# Patient Record
Sex: FEMALE | Employment: OTHER | URBAN - METROPOLITAN AREA
[De-identification: names, ages, dates, MRNs, and addresses within clinical notes are randomized per-mention and may not be internally consistent; named-entity substitution may affect disease eponyms.]

---

## 2018-02-26 ENCOUNTER — HOSPITAL ENCOUNTER (OUTPATIENT)
Dept: CT IMAGING | Age: 73
Discharge: HOME | End: 2018-02-26
Attending: INTERNAL MEDICINE
Payer: MEDICARE

## 2018-02-26 DIAGNOSIS — I26.99 PULMONARY EMBOLISM (HCC): ICD-10-CM

## 2018-02-26 LAB — CREAT UR-MCNC: 1 MG/DL (ref 0.6–1.3)

## 2018-02-26 PROCEDURE — 71275 CT ANGIOGRAPHY CHEST: CPT

## 2018-02-26 PROCEDURE — 74011636320 HC RX REV CODE- 636/320: Performed by: INTERNAL MEDICINE

## 2018-02-26 PROCEDURE — 82565 ASSAY OF CREATININE: CPT

## 2018-02-26 RX ADMIN — IOPAMIDOL 100 ML: 755 INJECTION, SOLUTION INTRAVENOUS at 13:35

## 2018-03-02 RX ORDER — ISOSORBIDE DINITRATE 40 MG/1
30 TABLET ORAL DAILY
Status: ON HOLD | COMMUNITY
End: 2018-04-23

## 2018-03-02 RX ORDER — CHOLECALCIFEROL (VITAMIN D3) 125 MCG
CAPSULE ORAL
Status: ON HOLD | COMMUNITY
End: 2018-04-23

## 2018-03-02 RX ORDER — LANOLIN ALCOHOL/MO/W.PET/CERES
400 CREAM (GRAM) TOPICAL DAILY
COMMUNITY

## 2018-03-02 NOTE — ROUTINE PROCESS
Cardiac Cath Lab:  Pre Procedure Chart Check     Patients chart was accessed and reviewed for possible and/or scheduled procedure. Creatinine Clearance:  Creatinine clearance cannot be calculated (Unknown ideal weight.)    Total Contrast  Load:  3 x estimated clearance amount=  ml    75% of Contrast Load:  0.75 x Total Contrast Load=    ml    No results for input(s): WBC, RBC, HCT, HGB, PLT, INR, APTT, PTP, NA, K, BUN, CREA, GFRAA, GFRNA, CA, MAG, CPK, CKMB, CKNDX, CKND1, TROPT, TROIQ, BNPP, BNP, HCTEXT, HGBEXT, PLTEXT in the last 72 hours. No lab exists for component: DDIMER, GLUCOSE, INREXT    BMI: There is no height or weight on file to calculate BMI. ALLERGIES:   Allergies   Allergen Reactions    Contrast Dye [Iodine] Anaphylaxis    Compazine [Prochlorperazine Edisylate] Other (comments)     Tries to swallow tongue?          Lines:                History:    Past Medical History:   Diagnosis Date    CAD (coronary artery disease)     Clostridium difficile diarrhea      Past Surgical History:   Procedure Laterality Date    HX CHOLECYSTECTOMY      HX COLONOSCOPY      HX CORONARY ARTERY BYPASS GRAFT      HX HYSTERECTOMY      HX ORTHOPAEDIC       Patient Active Problem List   Diagnosis Code    CHF (congestive heart failure) ([facility] [address] I50.9

## 2018-03-05 ENCOUNTER — HOSPITAL ENCOUNTER (OUTPATIENT)
Dept: CARDIAC CATH/INVASIVE PROCEDURES | Age: 73
Discharge: HOME | End: 2018-03-05
Attending: INTERNAL MEDICINE | Admitting: INTERNAL MEDICINE
Payer: MEDICARE

## 2018-03-05 VITALS
BODY MASS INDEX: 25.44 KG/M2 | DIASTOLIC BLOOD PRESSURE: 69 MMHG | TEMPERATURE: 98.6 F | SYSTOLIC BLOOD PRESSURE: 143 MMHG | RESPIRATION RATE: 18 BRPM | OXYGEN SATURATION: 97 % | HEART RATE: 78 BPM | HEIGHT: 64 IN | WEIGHT: 149 LBS

## 2018-03-05 LAB
ANION GAP SERPL CALC-SCNC: 6 MMOL/L (ref 3–18)
BUN SERPL-MCNC: 15 MG/DL (ref 7–18)
BUN/CREAT SERPL: 15 (ref 12–20)
CALCIUM SERPL-MCNC: 9.5 MG/DL (ref 8.5–10.1)
CHLORIDE SERPL-SCNC: 105 MMOL/L (ref 100–108)
CHOLEST SERPL-MCNC: 151 MG/DL
CO2 SERPL-SCNC: 26 MMOL/L (ref 21–32)
CREAT SERPL-MCNC: 1.03 MG/DL (ref 0.6–1.3)
ERYTHROCYTE [DISTWIDTH] IN BLOOD BY AUTOMATED COUNT: 12.8 % (ref 11.6–14.5)
GLUCOSE SERPL-MCNC: 136 MG/DL (ref 74–99)
HCT VFR BLD AUTO: 37.2 % (ref 35–45)
HDLC SERPL-MCNC: 39 MG/DL (ref 40–60)
HDLC SERPL: 3.9 {RATIO} (ref 0–5)
HGB BLD-MCNC: 12.5 G/DL (ref 12–16)
INR PPP: 0.9 (ref 0.8–1.2)
LDLC SERPL CALC-MCNC: 72 MG/DL (ref 0–100)
LIPID PROFILE,FLP: ABNORMAL
MAGNESIUM SERPL-MCNC: 2.3 MG/DL (ref 1.6–2.6)
MCH RBC QN AUTO: 30.8 PG (ref 24–34)
MCHC RBC AUTO-ENTMCNC: 33.6 G/DL (ref 31–37)
MCV RBC AUTO: 91.6 FL (ref 74–97)
PLATELET # BLD AUTO: 228 K/UL (ref 135–420)
PMV BLD AUTO: 10.2 FL (ref 9.2–11.8)
POTASSIUM SERPL-SCNC: 3.9 MMOL/L (ref 3.5–5.5)
PROTHROMBIN TIME: 12 SEC (ref 11.5–15.2)
RBC # BLD AUTO: 4.06 M/UL (ref 4.2–5.3)
SODIUM SERPL-SCNC: 137 MMOL/L (ref 136–145)
TRIGL SERPL-MCNC: 200 MG/DL (ref ?–150)
VLDLC SERPL CALC-MCNC: 40 MG/DL
WBC # BLD AUTO: 8.5 K/UL (ref 4.6–13.2)

## 2018-03-05 PROCEDURE — 74011250636 HC RX REV CODE- 250/636: Performed by: INTERNAL MEDICINE

## 2018-03-05 PROCEDURE — 74011250637 HC RX REV CODE- 250/637: Performed by: INTERNAL MEDICINE

## 2018-03-05 PROCEDURE — 74011250636 HC RX REV CODE- 250/636

## 2018-03-05 PROCEDURE — 74011000250 HC RX REV CODE- 250: Performed by: INTERNAL MEDICINE

## 2018-03-05 PROCEDURE — 80061 LIPID PANEL: CPT | Performed by: INTERNAL MEDICINE

## 2018-03-05 PROCEDURE — 85027 COMPLETE CBC AUTOMATED: CPT | Performed by: INTERNAL MEDICINE

## 2018-03-05 PROCEDURE — 80048 BASIC METABOLIC PNL TOTAL CA: CPT | Performed by: INTERNAL MEDICINE

## 2018-03-05 PROCEDURE — 83735 ASSAY OF MAGNESIUM: CPT | Performed by: INTERNAL MEDICINE

## 2018-03-05 PROCEDURE — 74011636320 HC RX REV CODE- 636/320: Performed by: INTERNAL MEDICINE

## 2018-03-05 PROCEDURE — 99153 MOD SED SAME PHYS/QHP EA: CPT

## 2018-03-05 PROCEDURE — 74011000250 HC RX REV CODE- 250

## 2018-03-05 PROCEDURE — 85610 PROTHROMBIN TIME: CPT | Performed by: INTERNAL MEDICINE

## 2018-03-05 RX ORDER — ALPRAZOLAM 0.25 MG/1
0.25 TABLET ORAL
Status: COMPLETED | OUTPATIENT
Start: 2018-03-05 | End: 2018-03-05

## 2018-03-05 RX ORDER — FAMOTIDINE 10 MG/ML
20 INJECTION INTRAVENOUS EVERY 12 HOURS
Status: COMPLETED | OUTPATIENT
Start: 2018-03-05 | End: 2018-03-05

## 2018-03-05 RX ORDER — SODIUM CHLORIDE 9 MG/ML
50 INJECTION, SOLUTION INTRAVENOUS CONTINUOUS
Status: DISCONTINUED | OUTPATIENT
Start: 2018-03-05 | End: 2018-03-05 | Stop reason: HOSPADM

## 2018-03-05 RX ORDER — ALPRAZOLAM 0.5 MG/1
0.5 TABLET ORAL
Status: DISCONTINUED | OUTPATIENT
Start: 2018-03-05 | End: 2018-03-05

## 2018-03-05 RX ORDER — MIDAZOLAM HYDROCHLORIDE 1 MG/ML
.5-2 INJECTION, SOLUTION INTRAMUSCULAR; INTRAVENOUS
Status: DISCONTINUED | OUTPATIENT
Start: 2018-03-05 | End: 2018-03-05 | Stop reason: HOSPADM

## 2018-03-05 RX ORDER — HYDRALAZINE HYDROCHLORIDE 20 MG/ML
10 INJECTION INTRAMUSCULAR; INTRAVENOUS
Status: DISCONTINUED | OUTPATIENT
Start: 2018-03-05 | End: 2018-03-05 | Stop reason: HOSPADM

## 2018-03-05 RX ORDER — NITROGLYCERIN 5 MG/ML
INJECTION, SOLUTION INTRAVENOUS
Status: DISCONTINUED
Start: 2018-03-05 | End: 2018-03-05 | Stop reason: HOSPADM

## 2018-03-05 RX ORDER — SODIUM CHLORIDE 0.9 % (FLUSH) 0.9 %
5-10 SYRINGE (ML) INJECTION EVERY 8 HOURS
Status: DISCONTINUED | OUTPATIENT
Start: 2018-03-05 | End: 2018-03-05 | Stop reason: HOSPADM

## 2018-03-05 RX ORDER — LIDOCAINE HYDROCHLORIDE 10 MG/ML
10-30 INJECTION INFILTRATION; PERINEURAL
Status: DISCONTINUED | OUTPATIENT
Start: 2018-03-05 | End: 2018-03-05 | Stop reason: HOSPADM

## 2018-03-05 RX ORDER — LIDOCAINE HYDROCHLORIDE 10 MG/ML
INJECTION INFILTRATION; PERINEURAL
Status: COMPLETED
Start: 2018-03-05 | End: 2018-03-05

## 2018-03-05 RX ORDER — MIDAZOLAM HYDROCHLORIDE 1 MG/ML
INJECTION, SOLUTION INTRAMUSCULAR; INTRAVENOUS
Status: COMPLETED
Start: 2018-03-05 | End: 2018-03-05

## 2018-03-05 RX ORDER — FENTANYL CITRATE 50 UG/ML
INJECTION, SOLUTION INTRAMUSCULAR; INTRAVENOUS
Status: COMPLETED
Start: 2018-03-05 | End: 2018-03-05

## 2018-03-05 RX ORDER — HEPARIN SODIUM 200 [USP'U]/100ML
500 INJECTION, SOLUTION INTRAVENOUS
Status: DISCONTINUED | OUTPATIENT
Start: 2018-03-05 | End: 2018-03-05 | Stop reason: HOSPADM

## 2018-03-05 RX ORDER — GUAIFENESIN 100 MG/5ML
81 LIQUID (ML) ORAL DAILY
Status: COMPLETED | OUTPATIENT
Start: 2018-03-05 | End: 2018-03-05

## 2018-03-05 RX ORDER — HYDRALAZINE HYDROCHLORIDE 20 MG/ML
INJECTION INTRAMUSCULAR; INTRAVENOUS
Status: COMPLETED
Start: 2018-03-05 | End: 2018-03-05

## 2018-03-05 RX ORDER — SODIUM CHLORIDE 0.9 % (FLUSH) 0.9 %
5-10 SYRINGE (ML) INJECTION AS NEEDED
Status: DISCONTINUED | OUTPATIENT
Start: 2018-03-05 | End: 2018-03-05 | Stop reason: HOSPADM

## 2018-03-05 RX ORDER — DEXTROMETHORPHAN HYDROBROMIDE, GUAIFENESIN 5; 100 MG/5ML; MG/5ML
650 LIQUID ORAL EVERY 8 HOURS
COMMUNITY

## 2018-03-05 RX ORDER — ADENOSINE 3 MG/ML
140 INJECTION, SOLUTION INTRAVENOUS
Status: DISCONTINUED | OUTPATIENT
Start: 2018-03-05 | End: 2018-03-05 | Stop reason: HOSPADM

## 2018-03-05 RX ORDER — DIPHENHYDRAMINE HYDROCHLORIDE 50 MG/ML
25 INJECTION, SOLUTION INTRAMUSCULAR; INTRAVENOUS ONCE
Status: COMPLETED | OUTPATIENT
Start: 2018-03-05 | End: 2018-03-05

## 2018-03-05 RX ORDER — VERAPAMIL HYDROCHLORIDE 2.5 MG/ML
INJECTION, SOLUTION INTRAVENOUS
Status: DISCONTINUED
Start: 2018-03-05 | End: 2018-03-05 | Stop reason: HOSPADM

## 2018-03-05 RX ORDER — HEPARIN SODIUM 200 [USP'U]/100ML
INJECTION, SOLUTION INTRAVENOUS
Status: COMPLETED
Start: 2018-03-05 | End: 2018-03-05

## 2018-03-05 RX ORDER — HEPARIN SODIUM 1000 [USP'U]/ML
INJECTION, SOLUTION INTRAVENOUS; SUBCUTANEOUS
Status: DISCONTINUED
Start: 2018-03-05 | End: 2018-03-05 | Stop reason: HOSPADM

## 2018-03-05 RX ORDER — LORAZEPAM 1 MG/1
.5-1 TABLET ORAL ONCE
Status: COMPLETED | OUTPATIENT
Start: 2018-03-05 | End: 2018-03-05

## 2018-03-05 RX ORDER — FENTANYL CITRATE 50 UG/ML
25-100 INJECTION, SOLUTION INTRAMUSCULAR; INTRAVENOUS
Status: DISCONTINUED | OUTPATIENT
Start: 2018-03-05 | End: 2018-03-05 | Stop reason: HOSPADM

## 2018-03-05 RX ORDER — HEPARIN SODIUM 1000 [USP'U]/ML
4000 INJECTION, SOLUTION INTRAVENOUS; SUBCUTANEOUS ONCE
Status: COMPLETED | OUTPATIENT
Start: 2018-03-05 | End: 2018-03-05

## 2018-03-05 RX ADMIN — FENTANYL CITRATE 50 MCG: 50 INJECTION, SOLUTION INTRAMUSCULAR; INTRAVENOUS at 09:43

## 2018-03-05 RX ADMIN — MIDAZOLAM HYDROCHLORIDE 1 MG: 1 INJECTION, SOLUTION INTRAMUSCULAR; INTRAVENOUS at 09:43

## 2018-03-05 RX ADMIN — FAMOTIDINE 20 MG: 10 INJECTION, SOLUTION INTRAVENOUS at 08:34

## 2018-03-05 RX ADMIN — MIDAZOLAM HYDROCHLORIDE 1 MG: 1 INJECTION, SOLUTION INTRAMUSCULAR; INTRAVENOUS at 10:27

## 2018-03-05 RX ADMIN — FENTANYL CITRATE 50 MCG: 50 INJECTION, SOLUTION INTRAMUSCULAR; INTRAVENOUS at 10:28

## 2018-03-05 RX ADMIN — HEPARIN SODIUM 1000 UNITS: 200 INJECTION, SOLUTION INTRAVENOUS at 09:09

## 2018-03-05 RX ADMIN — IOPAMIDOL 150 ML: 510 INJECTION, SOLUTION INTRAVASCULAR at 10:57

## 2018-03-05 RX ADMIN — LIDOCAINE HYDROCHLORIDE 5 ML: 10 INJECTION INFILTRATION; PERINEURAL at 09:46

## 2018-03-05 RX ADMIN — LIDOCAINE HYDROCHLORIDE 10 ML: 10 INJECTION INFILTRATION; PERINEURAL at 09:53

## 2018-03-05 RX ADMIN — LIDOCAINE HYDROCHLORIDE 5 ML: 10 INJECTION INFILTRATION; PERINEURAL at 09:42

## 2018-03-05 RX ADMIN — HEPARIN SODIUM 1000 UNITS: 200 INJECTION, SOLUTION INTRAVENOUS at 10:53

## 2018-03-05 RX ADMIN — FENTANYL CITRATE 50 MCG: 50 INJECTION, SOLUTION INTRAMUSCULAR; INTRAVENOUS at 09:49

## 2018-03-05 RX ADMIN — LORAZEPAM 1 MG: 1 TABLET ORAL at 08:31

## 2018-03-05 RX ADMIN — HYDRALAZINE HYDROCHLORIDE 10 MG: 20 INJECTION INTRAMUSCULAR; INTRAVENOUS at 10:18

## 2018-03-05 RX ADMIN — FENTANYL CITRATE 25 MCG: 50 INJECTION, SOLUTION INTRAMUSCULAR; INTRAVENOUS at 11:27

## 2018-03-05 RX ADMIN — FENTANYL CITRATE 25 MCG: 50 INJECTION, SOLUTION INTRAMUSCULAR; INTRAVENOUS at 11:23

## 2018-03-05 RX ADMIN — DIPHENHYDRAMINE HYDROCHLORIDE 25 MG: 50 INJECTION, SOLUTION INTRAMUSCULAR; INTRAVENOUS at 08:33

## 2018-03-05 RX ADMIN — MIDAZOLAM HYDROCHLORIDE 0.5 MG: 1 INJECTION, SOLUTION INTRAMUSCULAR; INTRAVENOUS at 09:54

## 2018-03-05 RX ADMIN — HYDRALAZINE HYDROCHLORIDE 10 MG: 20 INJECTION INTRAMUSCULAR; INTRAVENOUS at 10:28

## 2018-03-05 RX ADMIN — ASPIRIN 81 MG 81 MG: 81 TABLET ORAL at 08:31

## 2018-03-05 RX ADMIN — HEPARIN SODIUM 1000 UNITS: 200 INJECTION, SOLUTION INTRAVENOUS at 09:00

## 2018-03-05 RX ADMIN — HEPARIN SODIUM 3000 UNITS: 1000 INJECTION, SOLUTION INTRAVENOUS; SUBCUTANEOUS at 10:24

## 2018-03-05 RX ADMIN — MIDAZOLAM HYDROCHLORIDE 0.5 MG: 1 INJECTION, SOLUTION INTRAMUSCULAR; INTRAVENOUS at 11:23

## 2018-03-05 RX ADMIN — IOPAMIDOL 155 ML: 510 INJECTION, SOLUTION INTRAVASCULAR at 11:39

## 2018-03-05 RX ADMIN — METHYLPREDNISOLONE SODIUM SUCCINATE 125 MG: 125 INJECTION, POWDER, FOR SOLUTION INTRAMUSCULAR; INTRAVENOUS at 08:36

## 2018-03-05 RX ADMIN — HEPARIN SODIUM 4000 UNITS: 1000 INJECTION, SOLUTION INTRAVENOUS; SUBCUTANEOUS at 09:49

## 2018-03-05 RX ADMIN — MIDAZOLAM HYDROCHLORIDE 1 MG: 1 INJECTION, SOLUTION INTRAMUSCULAR; INTRAVENOUS at 11:27

## 2018-03-05 RX ADMIN — SODIUM CHLORIDE 50 ML/HR: 900 INJECTION, SOLUTION INTRAVENOUS at 11:24

## 2018-03-05 RX ADMIN — LIDOCAINE HYDROCHLORIDE 5 ML: 10 INJECTION, SOLUTION INFILTRATION; PERINEURAL at 09:42

## 2018-03-05 RX ADMIN — ALPRAZOLAM 0.25 MG: 0.25 TABLET ORAL at 15:47

## 2018-03-05 RX ADMIN — SODIUM CHLORIDE 50 ML/HR: 900 INJECTION, SOLUTION INTRAVENOUS at 08:31

## 2018-03-05 NOTE — PROGRESS NOTES
1200  Pt returned from cath lab alert, states abd hurting, unable to urinate,  Pat RCIS states bladder is distended under mahamed scope, #18fr najera cath placed under sterile tech, 700 clear pale yellow urine obtained, pt tolerated procedure well  1220 pt sleeping, no distress noted  1240 venous sheath in right groin area removed.  Gwyn pressure applied, area covered with 4x4 and Tegaderm, neuro/vasc assessment of right lower extremity WNL

## 2018-03-05 NOTE — PROGRESS NOTES
Pt discharged via wheelchair to car in care of ,  Pt states headache 8/10 but does not want to go to er.   Pt advised to go to ER if headache does not get better after eating and resting,  Arm bands removed and shredded

## 2018-03-05 NOTE — IP AVS SNAPSHOT
[address] [address] 
[phone] Patient: Jose Guallpa MRN: [id number] :1945 About your hospitalization You were admitted on:  2018 You last received care in the:  [address] You were discharged on:  2018 Why you were hospitalized Your primary diagnosis was:  Not on File Follow-up Information Follow up With Details Comments Contact Info Medina Kelsey. Ishmael Bui, [address] [facility] [address] [address] 
[phone] Charlotte Rodriguez MD In 2 weeks Follow up as instructed [address] [facility] [address] [facility] 
[phone] Discharge Orders None A check nettie indicates which time of day the medication should be taken. My Medications CONTINUE taking these medications Instructions Each Dose to Equal  
 Morning Noon Evening Bedtime  
 acetaminophen 650 mg Tber Commonly known as:  TYLENOL Your last dose was: Your next dose is: Take 650 mg by mouth every eight (8) hours. 650 mg  
    
   
   
   
  
 aspirin delayed-release 325 mg tablet Your last dose was: Your next dose is: Take  by mouth every six (6) hours as needed for Pain. COQ10  100-100 mg-unit Cap Generic drug:  coenzyme q10-vitamin e Your last dose was: Your next dose is: Take  by mouth. ISORDIL 40 mg tablet Generic drug:  isosorbide dinitrate Your last dose was: Your next dose is: Take 30 mg by mouth daily. 30 mg  
    
   
   
   
  
 L-METHYLFOLATE 7.5 mg Tab Generic drug:  l-methylfolate Your last dose was: Your next dose is: Take  by mouth. LIPITOR 80 mg tablet Generic drug:  atorvastatin Your last dose was: Your next dose is: Take 80 mg by mouth daily. 80 mg  
    
   
   
   
  
 magnesium oxide 400 mg tablet Commonly known as:  MAG-OX Your last dose was: Your next dose is: Take 400 mg by mouth daily. 400 mg  
    
   
   
   
  
 metoprolol tartrate 25 mg tablet Commonly known as:  LOPRESSOR Your last dose was: Your next dose is: Take 1 Tab by mouth every twelve (12) hours. 25 mg  
    
   
   
   
  
 nitroglycerin 0.4 mg SL tablet Commonly known as:  NITROSTAT Your last dose was: Your next dose is: 0.4 mg by SubLINGual route every five (5) minutes as needed for Chest Pain. 0.4 mg  
    
   
   
   
  
 potassium chloride 20 mEq packet Commonly known as:  KLOR-CON Your last dose was: Your next dose is: Take 20 mEq by mouth two (2) times a day. 20 mEq PROzac 20 mg capsule Generic drug:  FLUoxetine Your last dose was: Your next dose is: Take 20 mg by mouth daily. 20 mg  
    
   
   
   
  
 torsemide 5 mg tablet Commonly known as:  DEMADEX Your last dose was: Your next dose is: Take 5 mg by mouth daily. As needed  Indications: Edema  
 5 mg VIT B COMPLEX 100 COMBO NO.2 PO Your last dose was: Your next dose is: Take  by mouth. VITAMIN D3 2,000 unit Tab Generic drug:  cholecalciferol (vitamin D3) Your last dose was: Your next dose is: Take  by mouth. Discharge Instructions Cardiac Catheterization/Angiography Discharge Instructions *Check the puncture site frequently for swelling or bleeding. If you see any bleeding, lie down and apply pressure over the area with a clean town or washcloth.  Notify your doctor for any redness, swelling, drainage or oozing from the puncture site. Notify your doctor for any fever or chills. *If the leg or arm with the puncture becomes cold, numb or painful, call Dr Jessica Garcia *Activity should be limited for the next 48 hours. Climb stairs as little as possible and avoid any stooping, bending or strenuous activity for 48 hours. No heavy lifting (anything over 10 pounds) for three days. *Do not drive for 48 hours. *You may resume your usual diet. Drink more fluids than usual. 
 
*Have a responsible person drive you home and stay with you for at least 24 hours after your heart catheterization/angiography. *You may remove the bandage from your Right and Arm in 24 hours. You may shower in 24 hours. No tub baths, hot tubs or swimming for one week. Do not place any lotions, creams, powders, ointments over the puncture site for one week. You may place a clean band-aid over the puncture site each day for 5 days. Change this daily. DISCHARGE SUMMARY from Nurse PATIENT INSTRUCTIONS: 
 
 
F-face looks uneven A-arms unable to move or move unevenly S-speech slurred or non-existent T-time-call 911 as soon as signs and symptoms begin-DO NOT go Back to bed or wait to see if you get better-TIME IS BRAIN. Warning Signs of HEART ATTACK Call 911 if you have these symptoms: 
? Chest discomfort. Most heart attacks involve discomfort in the center of the chest that lasts more than a few minutes, or that goes away and comes back. It can feel like uncomfortable pressure, squeezing, fullness, or pain. ? Discomfort in other areas of the upper body. Symptoms can include pain or discomfort in one or both arms, the back, neck, jaw, or stomach. ? Shortness of breath with or without chest discomfort. ? Other signs may include breaking out in a cold sweat, nausea, or lightheadedness. Don't wait more than five minutes to call [address]! Fast action can save your life. Calling 911 is almost always the fastest way to get lifesaving treatment. Emergency Medical Services staff can begin treatment when they arrive  up to an hour sooner than if someone gets to the hospital by car. The discharge information has been reviewed with the patient and spouse. The patient and spouse verbalized understanding. Discharge medications reviewed with the patient and spouse and appropriate educational materials and side effects teaching were provided. Patient armband removed and shredded 
___________________________________________________________________________________________________________________________________ Introducing [facility]! Satinder Davis introduces [employer] patient portal. Now you can access parts of your medical record, email your doctor's office, and request medication refills online. 1. In your internet browser, go to [email or web address] 2. Click on the First Time User? Click Here link in the Sign In box. You will see the New Member Sign Up page. 3. Enter your [employer] Access Code exactly as it appears below. You will not need to use this code after youve completed the sign-up process. If you do not sign up before the expiration date, you must request a new code. · [employer] Access Code: [email or web address] Expires: 5/20/2018  2:26 PM 
 
4. Enter the last four digits of your Social Security Number (xxxx) and Date of Birth (mm/dd/yyyy) as indicated and click Submit. You will be taken to the next sign-up page. 5. Create a [employer] ID. This will be your [employer] login ID and cannot be changed, so think of one that is secure and easy to remember. 6. Create a [employer] password. You can change your password at any time. 7. Enter your Password Reset Question and Answer. This can be used at a later time if you forget your password. 8. Enter your e-mail address. You will receive e-mail notification when new information is available in [address] 9. Click Sign Up. You can now view and download portions of your medical record. 10. Click the Download Summary menu link to download a portable copy of your medical information. If you have questions, please visit the Frequently Asked Questions section of the [email or web address] website. Remember, [email or web address] is NOT to be used for urgent needs. For medical emergencies, dial 911. Now available from your iPhone and Android! Providers Seen During Your Hospitalization Provider Specialty Primary office phone Charlotte Rodriguez MD Cardiology [phone] Your Primary Care Physician (PCP) Primary Care Physician Office Phone Office Fax [address] [facility], [address] [phone] You are allergic to the following Allergen Reactions Contrast Dye (Iodine) Anaphylaxis Compazine (Prochlorperazine Edisylate) Other (comments) Tries to swallow tongue? Recent Documentation Height Weight BMI OB Status Smoking Status 1.626 m 67.6 kg 25.58 kg/m2 Hysterectomy Never Smoker Emergency Contacts Name Discharge Info Relation Home Work Mobile Sharif Pardo DISCHARGE CAREGIVER [3] Spouse [3] [phone] Patient Belongings The following personal items are in your possession at time of discharge: 
                             
 
  
  
 Please provide this summary of care documentation to your next provider. Signatures-by signing, you are acknowledging that this After Visit Summary has been reviewed with you and you have received a copy. Patient Signature:  ____________________________________________________________ Date:  ____________________________________________________________  
  
Marlyse Leaks Provider Signature:  ____________________________________________________________ Date:  ____________________________________________________________

## 2018-03-05 NOTE — ROUTINE PROCESS
Cardiac Cath Lab:  Pre Procedure Chart Check     Patients chart was accessed and reviewed for possible and/or scheduled procedure. Creatinine Clearance:  Creatinine clearance cannot be calculated (Unknown ideal weight.)    Total Contrast  Load:  3 x estimated clearance amount=  ml    75% of Contrast Load:  0.75 x Total Contrast Load=    ml    No results for input(s): WBC, RBC, HCT, HGB, PLT, INR, APTT, PTP, NA, K, BUN, CREA, GFRAA, GFRNA, CA, MAG, CPK, CKMB, CKNDX, CKND1, TROPT, TROIQ, BNPP, BNP, HCTEXT, HGBEXT, PLTEXT in the last 72 hours. No lab exists for component: DDIMER, GLUCOSE, INREXT    BMI: There is no height or weight on file to calculate BMI. ALLERGIES:   Allergies   Allergen Reactions    Contrast Dye [Iodine] Anaphylaxis    Compazine [Prochlorperazine Edisylate] Other (comments)     Tries to swallow tongue?          Lines:                History:    Past Medical History:   Diagnosis Date    CAD (coronary artery disease)     Clostridium difficile diarrhea      Past Surgical History:   Procedure Laterality Date    HX CHOLECYSTECTOMY      HX COLONOSCOPY      HX CORONARY ARTERY BYPASS GRAFT      HX HYSTERECTOMY      HX ORTHOPAEDIC       Patient Active Problem List   Diagnosis Code    CHF (congestive heart failure) ([facility] [address] I50.9

## 2018-03-05 NOTE — ROUTINE PROCESS
Cardiac Cath Lab:  Pre Procedure Chart Check     Patients chart was accessed and reviewed for possible and/or scheduled procedure. Creatinine Clearance:  CREATININE: 1.03 MG/DL (03/05/18 0758)  Estimated creatinine clearance: 48.1 mL/min    Total Contrast  Load:  3 x estimated clearance amount=  144.3 ml    75% of Contrast Load:  0.75 x Total Contrast Load=    108.22 ml    Recent Labs      03/05/18   0758   WBC  8.5   RBC  4.06*   HCT  37.2   HGB  12.5   PLT  228   INR  0.9   PTP  12.0   NA  137   K  3.9   BUN  15   CREA  1.03   GFRAA  >60   GFRNA  53*   CA  9.5       BMI: Body mass index is 25.58 kg/(m^2). ALLERGIES:   Allergies   Allergen Reactions    Contrast Dye [Iodine] Anaphylaxis    Compazine [Prochlorperazine Edisylate] Other (comments)     Tries to swallow tongue? Lines:        Peripheral IV 03/05/18 Left Hand (Active)   Site Assessment Clean, dry, & intact 3/5/2018  8:25 AM   Phlebitis Assessment 0 3/5/2018  8:25 AM   Infiltration Assessment 0 3/5/2018  8:25 AM   Dressing Status Clean, dry, & intact 3/5/2018  8:25 AM   Dressing Type Tape;Transparent 3/5/2018  8:25 AM   Hub Color/Line Status Blue;Flushed;Capped; Infusing 3/5/2018  8:25 AM   Action Taken Open ports on tubing capped 3/5/2018  8:25 AM   Alcohol Cap Used Yes 3/5/2018  8:25 AM          History:    Past Medical History:   Diagnosis Date    CAD (coronary artery disease)     Clostridium difficile diarrhea      Past Surgical History:   Procedure Laterality Date    HX CHOLECYSTECTOMY      HX COLONOSCOPY      HX CORONARY ARTERY BYPASS GRAFT      HX HYSTERECTOMY      HX ORTHOPAEDIC       Patient Active Problem List   Diagnosis Code    CHF (congestive heart failure) ([facility] [address] I50.9

## 2018-03-05 NOTE — DISCHARGE INSTRUCTIONS
Cardiac Catheterization/Angiography Discharge Instructions    *Check the puncture site frequently for swelling or bleeding. If you see any bleeding, lie down and apply pressure over the area with a clean town or washcloth. Notify your doctor for any redness, swelling, drainage or oozing from the puncture site. Notify your doctor for any fever or chills. *If the leg or arm with the puncture becomes cold, numb or painful, call Dr Marcelino Merino    *Activity should be limited for the next 48 hours. Climb stairs as little as possible and avoid any stooping, bending or strenuous activity for 48 hours. No heavy lifting (anything over 10 pounds) for three days. *Do not drive for 48 hours. *You may resume your usual diet. Drink more fluids than usual.    *Have a responsible person drive you home and stay with you for at least 24 hours after your heart catheterization/angiography. *You may remove the bandage from your Right and Arm in 24 hours. You may shower in 24 hours. No tub baths, hot tubs or swimming for one week. Do not place any lotions, creams, powders, ointments over the puncture site for one week. You may place a clean band-aid over the puncture site each day for 5 days. Change this daily. DISCHARGE SUMMARY from Nurse    PATIENT INSTRUCTIONS:    After general anesthesia or intravenous sedation, for 24 hours or while taking prescription Narcotics:  · Limit your activities  · Do not drive and operate hazardous machinery  · Do not make important personal or business decisions  · Do  not drink alcoholic beverages  · If you have not urinated within 8 hours after discharge, please contact your surgeon on call.     Report the following to your surgeon:  · Excessive pain, swelling, redness or odor of or around the surgical area  · Temperature over 100.5  · Nausea and vomiting lasting longer than 4 hours or if unable to take medications  · Any signs of decreased circulation or nerve impairment to extremity: change in color, persistent  numbness, tingling, coldness or increase pain  · Any questions    What to do at Home:  Recommended activity: Activity as tolerated,   Please give a list of your current medications to your Primary Care Provider. *  Please update this list whenever your medications are discontinued, doses are      changed, or new medications (including over-the-counter products) are added. *  Please carry medication information at all times in case of emergency situations. These are general instructions for a healthy lifestyle:    No smoking/ No tobacco products/ Avoid exposure to second hand smoke  Surgeon General's Warning:  Quitting smoking now greatly reduces serious risk to your health. Obesity, smoking, and sedentary lifestyle greatly increases your risk for illness    A healthy diet, regular physical exercise & weight monitoring are important for maintaining a healthy lifestyle    You may be retaining fluid if you have a history of heart failure or if you experience any of the following symptoms:  Weight gain of 3 pounds or more overnight or 5 pounds in a week, increased swelling in our hands or feet or shortness of breath while lying flat in bed. Please call your doctor as soon as you notice any of these symptoms; do not wait until your next office visit. Recognize signs and symptoms of STROKE:    F-face looks uneven    A-arms unable to move or move unevenly    S-speech slurred or non-existent    T-time-call 911 as soon as signs and symptoms begin-DO NOT go       Back to bed or wait to see if you get better-TIME IS BRAIN. Warning Signs of HEART ATTACK     Call 911 if you have these symptoms:   Chest discomfort. Most heart attacks involve discomfort in the center of the chest that lasts more than a few minutes, or that goes away and comes back. It can feel like uncomfortable pressure, squeezing, fullness, or pain.  Discomfort in other areas of the upper body. Symptoms can include pain or discomfort in one or both arms, the back, neck, jaw, or stomach.  Shortness of breath with or without chest discomfort.  Other signs may include breaking out in a cold sweat, nausea, or lightheadedness. Don't wait more than five minutes to call 911 - MINUTES MATTER! Fast action can save your life. Calling 911 is almost always the fastest way to get lifesaving treatment. Emergency Medical Services staff can begin treatment when they arrive -- up to an hour sooner than if someone gets to the hospital by car. The discharge information has been reviewed with the patient and spouse. The patient and spouse verbalized understanding. Discharge medications reviewed with the patient and spouse and appropriate educational materials and side effects teaching were provided.     Patient armband removed and shredded  ___________________________________________________________________________________________________________________________________

## 2018-03-05 NOTE — PROGRESS NOTES
Pt is all prepped and ready for procedure , premedicated as ordered for iodine allergy. 1305 TR band removed, some swelling and bleeding noted, pressure applied and bleeding subsided. 4x4 and Tegaderm applied to site.

## 2018-03-05 NOTE — ROUTINE PROCESS
TRANSFER - OUT REPORT:  Verbal report given to ABRAM(name) on [employer] being transferred to CARE(unit) for routine progression of care   Report consisted of patients Situation, Background, Assessment and   Recommendations(SBAR). Information from the following report(s) Procedure Summary, MAR, Recent Results, Med Rec Status and Cardiac Rhythm NSR was reviewed with the receiving nurse. Cath Lab Report:    Procedure:  Charish.Plough ] LHC  Charish.Plough ] RHC  [ ] PTCA   [ ] Peripheral   [ ] Pacemaker [ ] DEYANIRA  [ ] [address]    Access site:   Charish.Plough ] Radial  LEFT   Charish.Plough ] Brachial  RIGHT  Charish.Plough ] Femoral  RIGHT        Sheath:           Charish.Plough ] Pulled in Cath Angelitada Dapper [ ] In place   [ ] To be pulled after:         Closure:          Charish.Plough ] Radial Band [ ] Right Charish.Plough ] Left    [ ] Manual Pressure     [ ] Lyssa Kinnier     [ ] Star Close    Charish.Plough ] Per Close  RIGHT FEMORAL    [ ] Safe Guard    Site Assessment:   Charish.Plough ] Clean, Dry, No bleeding    [ ] Minor oozing          [ ] Hematoma: Description:    Stents(s) Placement:  [ ] Left Main:                 [ ] LAD:                [ ] Circ:                [ ] RCA:                [ ] EF:     [ ] Peripheral:      Charish.Plough ] N/A        Intra procedure Medications:    Fentanyl:200 MCG  Versed:4 MG  Heparin:7000 UNITS    Lines:        Peripheral IV 03/05/18 Left Hand (Active)   Site Assessment Clean, dry, & intact 3/5/2018  8:25 AM   Phlebitis Assessment 0 3/5/2018  8:25 AM   Infiltration Assessment 0 3/5/2018  8:25 AM   Dressing Status Clean, dry, & intact 3/5/2018  8:25 AM   Dressing Type Tape;Transparent 3/5/2018  8:25 AM   Hub Color/Line Status Blue;Flushed;Capped; Infusing 3/5/2018  8:25 AM   Action Taken Open ports on tubing capped 3/5/2018  8:25 AM   Alcohol Cap Used Yes 3/5/2018  8:25 AM       Peripheral IV 03/05/18 Right Antecubital (Active)   Site Assessment Clean, dry, & intact 3/5/2018  9:07 AM   Phlebitis Assessment 0 3/5/2018  9:07 AM   Infiltration Assessment 0 3/5/2018  9:07 AM Dressing Status Clean, dry, & intact 3/5/2018  9:07 AM   Dressing Type Tape;Transparent 3/5/2018  9:07 AM   Hub Color/Line Status Pink;Flushed 3/5/2018  9:07 AM   Action Taken Open ports on tubing capped 3/5/2018  9:07 AM   Alcohol Cap Used Yes 3/5/2018  9:07 AM     Sheath 03/05/18 (Active)   Site Assessment Clean, dry, & intact 3/5/2018 10:03 AM   Hub Color/Line Status Green 3/5/2018 10:03 AM       Patient Vitals for the past 4 hrs:   Temp Pulse Resp BP SpO2   03/05/18 1140 99 °F (37.2 °C) 72 20 158/59 99 %   03/05/18 0822 99.3 °F (37.4 °C) 61 20 160/74 96 %         Extended / Orthostatic Vitals:    Vital Signs  Level of Consciousness: Alert (03/05/18 1140)  Temp: 99 °F (37.2 °C) (03/05/18 1140)  Temp Source: Oral (03/05/18 1140)  Pulse (Heart Rate): 72 (03/05/18 1140)  Heart Rate Source: Monitor (03/05/18 1140)  Cardiac Rhythm: Normal sinus rhythm (03/05/18 1140)  Resp Rate: 20 (03/05/18 1140)  BP: 158/59 (03/05/18 1140)  MAP (Calculated): 92 (03/05/18 1140)  BP 1 Location: Left leg (03/05/18 1140)  BP 1 Method: Automatic (03/05/18 1140)  BP Patient Position: At rest;Supine (03/05/18 1140)  MEWS Score: 1 (03/05/18 1140)         Oxygen Therapy  O2 Sat (%): 99 % (03/05/18 1140)  O2 Device: Room air (03/05/18 1140)          Opportunity for questions and clarification was provided.

## 2018-03-05 NOTE — PROGRESS NOTES
Cardiology Progress Note        Patient: Martita Noland        Sex: female          DOA: 3/5/2018  YOB: 1945      Age:  81 y.o.        LOS:  LOS: 0 days   Assessment/Plan       Date of Surgery Update:  Martita Noland was seen and examined. History and physical has been reviewed. The patient has been examined.  There have been no significant clinical changes since the completion of the originally dated History and Physical.    Signed By: Sandy Huizar MD     March 5, 2018 9:16 AM           Signed By: Sandy Huizar MD     March 5, 2018

## 2018-03-05 NOTE — PROGRESS NOTES
Discharge inst given and reviewed with pt and ,  Both verbalize understanding, pt states having headache, boarder line of migraine, offered to take pt to go to ER, states wants to go home and lay in dark room.   Encourage to go to er if no better

## 2018-03-06 NOTE — PROCEDURES
[address] [facility]  MR#: [id number]  : 1945  ACCOUNT #: [de-identified]   DATE OF SERVICE: 2018    PROCEDURES PERFORMED  1. Left heart catheterization. 2.  Right heart catheterization. 3.  Selective coronary angiogram.    Counseling risks, benefits and alternatives were discussed in detail with the patient. INDICATION FOR THE PROCEDURE:  Recurrent chest pain and shortness of breath and fluctuating O2 saturation also. The patient had a recent stress test.  There was no indication to repeat the stress test and decided to proceed with the above procedures. Counseling risks, benefits and alternatives were discussed in detail with the patient and her  and they both agreed to proceed. DESCRIPTION OF PROCEDURE:  Patient was brought to the cardiac catheterization lab in a fasting and nonsedated state. The left radial area was anesthetized with local anesthetic and a 5 Western Maryanne JR4 catheter was advanced. Patient had significant left subclavian disease with ulcerative floor and then decided to proceed with the procedure through the right femoral area. Right femoral area was anesthetized with local anesthetic. A 6 Mauritanian sheath was placed in the right femoral artery and a 6 Mauritanian sheath was placed in the right femoral vein via modified Seldinger technique under fluoroscopic guidance without any complication and then a 6 Western Maryanne [place] catheter was advanced and right-sided intracardiac pressures were measured and multiple saturation samples were taken from the SVC inferior vena cava and mid right atrium and RV and PA and right and left wedge pressure position as well. The right heart catheterization completed without any complication. Then, a 5 Western Maryanne JR4 catheter was able to cross the aortic area and LVEDP was 12 mmHg. On pullback, there was no significant gradient.   Same catheter was used to perform the selective angiogram of the right coronary artery and the same catheter was used to perform the SVG graft angiography from aorta to the RCA, SVG graft to the OM branch, and SVG graft to the diagonal branch also used and then LIMA graft was also performed from the left radial approach. Angiography was performed without any complication. Patient remained hemodynamically stable. Aortic root angiography was also performed to see if there is any additional graft. The patient remained hemodynamically stable. FINDINGS  Right heart hemodynamics:   Pulmonary capillary mean pressure is 12 mmHg, A wave is 17 and V wave is 12 mmHg. PA pressures 32/8 with mean PA pressure of 18 mmHg. RV 39/ -1.  RA mean pressure is 5 mmHg, A wave is 6 mmHg and V wave is 8 mmHg. LV was 151/3,  aortic pressure was 142/66. Saturations:  Pulmonary capillary wedge pressure artery saturation on the right was 75.3%. Pulmonary wedge pressure artery saturation on the left side was 76.5%. PA saturation was 71.4% and RV was 71.1%. SVC was 67.6% and IVC was 72.3%. Aortic saturation 94.3%. Ponce cardiac output and Ponce cardiac index was 5.35 liters per minute and 3.09 liters per minute per square meter. Thermodilution cardiac output and cardiac index was 3.87 liters per minute and thermodilution cardiac index was 2.24 liters per minute per square meters. PVR was 0.25 Umanzor unit. Coronary anatomy:  Left main artery is severely diseased and proximal LAD has ulcerative 90% lesion and then in the mid area % occluded. The left circumflex artery also has proximal ulcerative lesion 100% occluded in the proximal area. RCA has ostial disease 60-70-80% disease and mid RCA also have severe 80% disease and there is a competitive flow from the antegrade and also from the graft to the PDA branch. Graft angiography:  SVG graft to the RCA, PDA branch is widely patent in its ostium, body at the site of anastomosis.     SVG graft to the OM branch is widely patent in its ostium, body at the site of anastomosis. The recipient OM artery is widely patent with mild luminal irregularities. SVG graft to the diagonal branch is widely patent at the site of anastomosis to the diagonal branch. There is a 50% ulcerative lesion and also filling the LAD very nicely with mild competitive flow to the LIMA graft to LAD. Mild degenerative changes in the proximal part of the SVG graft to the diagonal branch. LIMA graft to LAD is a small vessel, has competitive flow in the distal area, but attached to the distal LAD. FINAL IMPRESSION  1. Severe native vessel coronary artery disease. 2.  Widely patent all the coronary graft. 3.  SVG graft to the RCA is widely patent. 4.  SVG graft to the left circumflex OM artery is widely patent. 5.  SVG graft to the diagonal branch is widely patent with 50% small diagonal proximal disease. 6.  LIMA graft to LAD is patent with competitive flow through the LAD, in the distal LAD area. Right-sided intracardiac pressure was mildly elevated. PVR is less than 1. No evidence of any significant pulmonary hypertension. PLAN:  We will refer the patient to pulmonology as discussed with the patient and family.         Lizandro Etienne MD MA / Alicia Wall  D: 03/05/2018 19:52     T: 03/06/2018 09:03  JOB #: [id number]

## 2018-04-10 ENCOUNTER — HOSPITAL ENCOUNTER (OUTPATIENT)
Dept: PREADMISSION TESTING | Age: 73
Discharge: HOME | End: 2018-04-10
Payer: MEDICARE

## 2018-04-10 ENCOUNTER — OFFICE VISIT (OUTPATIENT)
Dept: VASCULAR SURGERY | Age: 73
End: 2018-04-10

## 2018-04-10 VITALS
DIASTOLIC BLOOD PRESSURE: 70 MMHG | RESPIRATION RATE: 18 BRPM | HEART RATE: 76 BPM | BODY MASS INDEX: 25.44 KG/M2 | SYSTOLIC BLOOD PRESSURE: 160 MMHG | HEIGHT: 64 IN | WEIGHT: 149 LBS

## 2018-04-10 DIAGNOSIS — I73.9 PERIPHERAL ARTERIAL DISEASE (HCC): Primary | ICD-10-CM

## 2018-04-10 LAB
ALBUMIN SERPL-MCNC: 3.7 G/DL (ref 3.4–5)
ALBUMIN/GLOB SERPL: 1 {RATIO} (ref 0.8–1.7)
ALP SERPL-CCNC: 65 U/L (ref 45–117)
ALT SERPL-CCNC: 25 U/L (ref 13–56)
ANION GAP SERPL CALC-SCNC: 10 MMOL/L (ref 3–18)
AST SERPL-CCNC: 17 U/L (ref 15–37)
BILIRUB SERPL-MCNC: 0.3 MG/DL (ref 0.2–1)
BUN SERPL-MCNC: 18 MG/DL (ref 7–18)
BUN/CREAT SERPL: 15 (ref 12–20)
CALCIUM SERPL-MCNC: 9 MG/DL (ref 8.5–10.1)
CHLORIDE SERPL-SCNC: 104 MMOL/L (ref 100–108)
CO2 SERPL-SCNC: 28 MMOL/L (ref 21–32)
CREAT SERPL-MCNC: 1.19 MG/DL (ref 0.6–1.3)
ERYTHROCYTE [DISTWIDTH] IN BLOOD BY AUTOMATED COUNT: 12.9 % (ref 11.6–14.5)
GLOBULIN SER CALC-MCNC: 3.6 G/DL (ref 2–4)
GLUCOSE SERPL-MCNC: 118 MG/DL (ref 74–99)
HCT VFR BLD AUTO: 34.3 % (ref 35–45)
HGB BLD-MCNC: 11.5 G/DL (ref 12–16)
INR PPP: 1 (ref 0.8–1.2)
MCH RBC QN AUTO: 30.7 PG (ref 24–34)
MCHC RBC AUTO-ENTMCNC: 33.5 G/DL (ref 31–37)
MCV RBC AUTO: 91.5 FL (ref 74–97)
PLATELET # BLD AUTO: 233 K/UL (ref 135–420)
PMV BLD AUTO: 9.8 FL (ref 9.2–11.8)
POTASSIUM SERPL-SCNC: 4 MMOL/L (ref 3.5–5.5)
PROT SERPL-MCNC: 7.3 G/DL (ref 6.4–8.2)
PROTHROMBIN TIME: 12.4 SEC (ref 11.5–15.2)
RBC # BLD AUTO: 3.75 M/UL (ref 4.2–5.3)
SODIUM SERPL-SCNC: 142 MMOL/L (ref 136–145)
WBC # BLD AUTO: 10.3 K/UL (ref 4.6–13.2)

## 2018-04-10 PROCEDURE — 85610 PROTHROMBIN TIME: CPT | Performed by: SURGERY

## 2018-04-10 PROCEDURE — 36415 COLL VENOUS BLD VENIPUNCTURE: CPT | Performed by: SURGERY

## 2018-04-10 PROCEDURE — 85027 COMPLETE CBC AUTOMATED: CPT | Performed by: SURGERY

## 2018-04-10 PROCEDURE — 80053 COMPREHEN METABOLIC PANEL: CPT | Performed by: SURGERY

## 2018-04-10 RX ORDER — SODIUM CHLORIDE 9 MG/ML
25 INJECTION, SOLUTION INTRAVENOUS CONTINUOUS
Status: CANCELLED | OUTPATIENT
Start: 2018-04-17

## 2018-04-10 RX ORDER — FLUMAZENIL 0.1 MG/ML
0.2 INJECTION INTRAVENOUS
Status: CANCELLED | OUTPATIENT
Start: 2018-04-17

## 2018-04-10 RX ORDER — FENTANYL CITRATE 50 UG/ML
25-200 INJECTION, SOLUTION INTRAMUSCULAR; INTRAVENOUS
Status: CANCELLED | OUTPATIENT
Start: 2018-04-17

## 2018-04-10 RX ORDER — MIDAZOLAM HYDROCHLORIDE 1 MG/ML
.5-4 INJECTION, SOLUTION INTRAMUSCULAR; INTRAVENOUS
Status: CANCELLED | OUTPATIENT
Start: 2018-04-17

## 2018-04-10 RX ORDER — HEPARIN SODIUM 1000 [USP'U]/ML
10000 INJECTION, SOLUTION INTRAVENOUS; SUBCUTANEOUS
Status: CANCELLED | OUTPATIENT
Start: 2018-04-17

## 2018-04-10 RX ORDER — NALOXONE HYDROCHLORIDE 0.4 MG/ML
0.1 INJECTION, SOLUTION INTRAMUSCULAR; INTRAVENOUS; SUBCUTANEOUS AS NEEDED
Status: CANCELLED | OUTPATIENT
Start: 2018-04-17

## 2018-04-10 NOTE — MR AVS SNAPSHOT
[address] [facility] 
 
 
 [facility] [address] [facility] 
[phone] Patient: Pablo Florentino MRN: [id number] :1945 Visit Information Date & Time Provider Department Dept. Phone Encounter #  
 4/10/2018 10:15 AM Kurt Copeland MD [facility] [address] [id number] Your Appointments 2018 10:15 AM  
HOSPITAL DISCHARGE with Kurt Copeland MD  
[facility] ([address]) Appt Note: discharge post left subclavion angio [facility] [address] [place] [address]  
  
   
 [facility] [address] Upcoming Health Maintenance Date Due Hepatitis C Screening 1945 DTaP/Tdap/Td series (1 - Tdap) 1968 BREAST CANCER SCRN MAMMOGRAM 1997 FOBT Q 1 YEAR AGE 50-75 1997 ZOSTER VACCINE AGE 60> 2007 GLAUCOMA SCREENING Q2Y 2012 Bone Densitometry (Dexa) Screening 2012 Pneumococcal 65+ Low/Medium Risk (1 of 2 - PCV13) 2012 Influenza Age 5 to Adult 2017 MEDICARE YEARLY EXAM 3/20/2018 Allergies as of 4/10/2018  Review Complete On: 4/10/2018 By: Karis Leyden, RN Severity Noted Reaction Type Reactions Contrast Dye [Iodine] High 2015    Anaphylaxis Compazine [Prochlorperazine Edisylate]  2015    Other (comments) Tries to swallow tongue? Current Immunizations  Never Reviewed No immunizations on file. Not reviewed this visit Vitals BP Pulse Resp Height(growth percentile) Weight(growth percentile) BMI  
 160/70 76 18 5' 4\" (1.626 m) 149 lb (67.6 kg) 25.58 kg/m2 OB Status Smoking Status Hysterectomy Never Smoker Vitals History BMI and BSA Data Body Mass Index Body Surface Area 25.58 kg/m 2 1.75 m 2 Your Updated Medication List  
  
   
 This list is accurate as of 4/10/18 11:27 AM.  Always use your most recent med list.  
  
  
  
  
 acetaminophen 650 mg Tber Commonly known as:  TYLENOL Take 650 mg by mouth every eight (8) hours. aspirin delayed-release 325 mg tablet Take  by mouth every six (6) hours as needed for Pain. Calcium Carbonate-Vit D3-Min 600 mg (1,500 mg)-400 unit Chew Take  by mouth. COQ10  100-100 mg-unit Cap Generic drug:  coenzyme q10-vitamin e Take  by mouth. ISORDIL 40 mg tablet Generic drug:  isosorbide dinitrate Take 30 mg by mouth daily. L-METHYLFOLATE 7.5 mg Tab Generic drug:  l-methylfolate Take  by mouth. LIPITOR 80 mg tablet Generic drug:  atorvastatin Take 80 mg by mouth daily. magnesium oxide 400 mg tablet Commonly known as:  MAG-OX Take 400 mg by mouth daily. metoprolol tartrate 25 mg tablet Commonly known as:  LOPRESSOR Take 1 Tab by mouth every twelve (12) hours. nitroglycerin 0.4 mg SL tablet Commonly known as:  NITROSTAT  
0.4 mg by SubLINGual route every five (5) minutes as needed for Chest Pain.  
  
 potassium chloride 20 mEq packet Commonly known as:  KLOR-CON Take 20 mEq by mouth two (2) times a day. PROzac 20 mg capsule Generic drug:  FLUoxetine Take 20 mg by mouth daily. torsemide 5 mg tablet Commonly known as:  DEMADEX Take 5 mg by mouth daily. As needed  Indications: Edema VIT B COMPLEX 100 COMBO NO.2 PO Take  by mouth. VITAMIN D3 2,000 unit Tab Generic drug:  cholecalciferol (vitamin D3) Take  by mouth. Please provide this summary of care documentation to your next provider. Your primary care clinician is listed as Adelita Hinojosa. Eleno Plummer. If you have any questions after today's visit, please call [phone].

## 2018-04-11 NOTE — PROGRESS NOTES
[address]    Chief Complaint   Patient presents with    Leg Pain       History and Physical    Ms. Miya Escobedo is a 81year old female referred to our office for evaluation of possible high grade left subclavian artery stenosis. Ms. Miya Escobedo has a previous history of a CABG with vein grafts per the patient. She initially does not have any complaints in regards to her arms, but after further questioning she does note that both her arms get fatigued easily and she states maybe her left is worse than her right. She is a former  and did a lot of repetitive movement with her arms and shoulders. She is a non smoker and is not diabetic. She denies dizziness or syncope with arm use. Past Medical History:   Diagnosis Date    Anemia     Arrhythmia     CAD (coronary artery disease)     Clostridium difficile diarrhea     Hypercholesterolemia     Hypertension     Thromboembolus [facility])      Patient Active Problem List   Diagnosis Code    CHF (congestive heart failure) ([facility]) I50.9    Peripheral arterial disease ([facility]) I73.9     Past Surgical History:   Procedure Laterality Date    CARDIAC SURG PROCEDURE UNLIST      HX CHOLECYSTECTOMY      HX COLONOSCOPY      HX CORONARY ARTERY BYPASS GRAFT      HX HYSTERECTOMY      HX ORTHOPAEDIC      foot surgery     RT & LT HEART WITH C.O.  3/17/2018    JOANNE CORONARY ARTERIOGRAPH  3/17/2018     Current Outpatient Prescriptions   Medication Sig Dispense Refill    CALCIUM CARB/VIT D3/MINERALS (CALCIUM CARBONATE-VIT D3-MIN) 600 mg (1,500 mg)-400 unit chew Take  by mouth.  acetaminophen (TYLENOL) 650 mg TbER Take 650 mg by mouth every eight (8) hours.  cholecalciferol, vitamin D3, (VITAMIN D3) 2,000 unit tab Take  by mouth.  VIT B COMPLEX 100 COMBO NO.2 PO Take  by mouth.  magnesium oxide (MAG-OX) 400 mg tablet Take 400 mg by mouth daily.  metoprolol (LOPRESSOR) 25 mg tablet Take 1 Tab by mouth every twelve (12) hours.  36 Tab 0    potassium chloride (KLOR-CON) 20 mEq packet Take 20 mEq by mouth two (2) times a day.  torsemide (DEMADEX) 5 mg tablet Take 5 mg by mouth daily. As needed  Indications: Edema      coenzyme q10-vitamin e (COQ10 ) 100-100 mg-unit cap Take  by mouth.  aspirin delayed-release 325 mg tablet Take  by mouth every six (6) hours as needed for Pain.  atorvastatin (LIPITOR) 80 mg tablet Take 80 mg by mouth daily.  FLUoxetine (PROZAC) 20 mg capsule Take 20 mg by mouth daily.  l-methylfolate (L-METHYLFOLATE) 7.5 mg tab Take  by mouth.  nitroglycerin (NITROSTAT) 0.4 mg SL tablet 0.4 mg by SubLINGual route every five (5) minutes as needed for Chest Pain.  isosorbide dinitrate (ISORDIL) 40 mg tablet Take 30 mg by mouth daily. Allergies   Allergen Reactions    Contrast Dye [Iodine] Anaphylaxis    Compazine [Prochlorperazine Edisylate] Other (comments)     Tries to swallow tongue? Social History     Social History    Marital status:      Spouse name: N/A    Number of children: N/A    Years of education: N/A     Occupational History    Not on file. Social History Main Topics    Smoking status: Never Smoker    Smokeless tobacco: Never Used    Alcohol use No    Drug use: No    Sexual activity: Not Currently     Other Topics Concern    Not on file     Social History Narrative      Family History   Problem Relation Age of Onset    Diabetes Mother     Heart Disease Mother     Hypertension Mother     Heart Disease Father     Diabetes Father     Hypertension Father     Stroke Father     Lung Disease Father     Hypertension Brother     Diabetes Brother     Heart Disease Brother        Review of Systems    Review of Systems   Constitutional: Negative for chills, diaphoresis, fever, malaise/fatigue and weight loss. HENT: Negative for hearing loss and sore throat. Eyes: Negative for blurred vision, photophobia and redness.    Respiratory: Positive for shortness of breath. Negative for cough, hemoptysis and wheezing. Cardiovascular: Negative for chest pain, palpitations and orthopnea. Gastrointestinal: Negative for abdominal pain, blood in stool, constipation, diarrhea, heartburn, nausea and vomiting. Genitourinary: Negative for dysuria, frequency, hematuria and urgency. Musculoskeletal: Negative for back pain and myalgias. Skin: Negative for itching and rash. Neurological: Negative for dizziness, speech change, focal weakness, weakness and headaches. Endo/Heme/Allergies: Does not bruise/bleed easily. Psychiatric/Behavioral: Negative for depression and suicidal ideas. Physical Exam:    Visit Vitals    /70  Comment: right arm    Pulse 76    Resp 18    Ht 5' 4\" (1.626 m)    Wt 149 lb (67.6 kg)    BMI 25.58 kg/m2      Physical Examination: General appearance - alert, well appearing, and in no distress  Mental status - alert, oriented to person, place, and time  Eyes - sclera anicteric, left eye normal, right eye normal  Ears - right ear normal, left ear normal  Nose - normal and patent, no erythema, discharge or polyps  Mouth - mucous membranes moist, pharynx normal without lesions  Neck - Soft left carotid bruit  Lymphatics - no palpable lymphadenopathy  Chest - clear to auscultation, no wheezes, rales or rhonchi, symmetric air entry  Heart - normal rate and regular rhythm  Abdomen - soft, nontender, nondistended, no masses or organomegaly  Extremities - 2+ radial pulse bilaterally. No change in pulse with movement      Impression and Plan:    ICD-10-CM ICD-9-CM    1.  Peripheral arterial disease (HCC) I73.9 443.9 CBC W/O DIFF      METABOLIC PANEL, COMPREHENSIVE      PROTHROMBIN TIME + INR     Orders Placed This Encounter    CBC W/O DIFF    METABOLIC PANEL, COMPREHENSIVE    PROTHROMBIN TIME + INR    CALCIUM CARB/VIT D3/MINERALS (CALCIUM CARBONATE-VIT D3-MIN) 600 mg (1,500 mg)-400 unit chew     I reviewed the images from Ms. Pardo's CTA in February of this year at [facility] and she has moderate stenosis on these images at most.  However, by report she has near occlusion of her subclavian artery on her CTA in March. Additionally, today in our office her SBP is 20-30 mmHg lower on the left than the right, suggestive of a significant stenosis. If she was asymptomatic I would recommend observation. However, this vague history of arm fatigue could in theory be due to this stenosis. I have offered an angiogram with a possible stenting of this stenosis. We discussed the risk of embolization not only to the hand but also to the vertebral artery. We also discussed that if her arm fatigue is not due to her perfusion then she may not note any change in her symptoms. Ms Olga Avilez has voiced understanding and has decided to proceed with an angiogram.  We will schedule this for as soon as feasible. Follow-up Disposition:  Return for post angiogram.      The treatment plan was reviewed with the patient in detail. The patient voiced understanding of this plan and all questions and concerns were addressed. The patient agrees with this plan. We discussed the signs and symptoms that would require earlier attention or intervention. The patient was given educational material related to his/her visit and the patient has voiced understanding of the material.     I appreciate the opportunity to participate in the care of your patient. I will be sure to keep you informed of any subsequent changes in the treatment plan. If you have any questions or concerns, please feel free to contact me. Christelle Hancock MD    PLEASE NOTE:  This document has been produced using voice recognition software. Unrecognized errors in transcription may be present.

## 2018-04-16 RX ORDER — FLUMAZENIL 0.1 MG/ML
0.2 INJECTION INTRAVENOUS
Status: DISCONTINUED | OUTPATIENT
Start: 2018-04-17 | End: 2018-04-16

## 2018-04-16 RX ORDER — NALOXONE HYDROCHLORIDE 0.4 MG/ML
0.1 INJECTION, SOLUTION INTRAMUSCULAR; INTRAVENOUS; SUBCUTANEOUS AS NEEDED
Status: DISCONTINUED | OUTPATIENT
Start: 2018-04-17 | End: 2018-04-16

## 2018-04-16 RX ORDER — SODIUM CHLORIDE 9 MG/ML
25 INJECTION, SOLUTION INTRAVENOUS CONTINUOUS
Status: DISCONTINUED | OUTPATIENT
Start: 2018-04-17 | End: 2018-04-16

## 2018-04-16 RX ORDER — MIDAZOLAM HYDROCHLORIDE 1 MG/ML
.5-4 INJECTION, SOLUTION INTRAMUSCULAR; INTRAVENOUS
Status: DISCONTINUED | OUTPATIENT
Start: 2018-04-17 | End: 2018-04-16

## 2018-04-16 RX ORDER — FENTANYL CITRATE 50 UG/ML
25-200 INJECTION, SOLUTION INTRAMUSCULAR; INTRAVENOUS
Status: DISCONTINUED | OUTPATIENT
Start: 2018-04-17 | End: 2018-04-16

## 2018-04-16 RX ORDER — HEPARIN SODIUM 1000 [USP'U]/ML
10000 INJECTION, SOLUTION INTRAVENOUS; SUBCUTANEOUS
Status: DISCONTINUED | OUTPATIENT
Start: 2018-04-17 | End: 2018-04-16

## 2018-04-17 ENCOUNTER — HOSPITAL ENCOUNTER (OUTPATIENT)
Dept: INTERVENTIONAL RADIOLOGY/VASCULAR | Age: 73
Discharge: HOME | End: 2018-04-17
Attending: SURGERY

## 2018-04-18 RX ORDER — PREDNISONE 10 MG/1
10 TABLET ORAL DAILY
COMMUNITY

## 2018-04-18 NOTE — PROGRESS NOTES
brett completed. Discussed NPO,  needed and to check in at radiology waiting room at [phone].   Reviewed premed needed for allergy to contrast.

## 2018-04-19 DIAGNOSIS — I73.9 PERIPHERAL ARTERIAL DISEASE (HCC): Primary | ICD-10-CM

## 2018-04-19 RX ORDER — PREDNISONE 50 MG/1
TABLET ORAL
Qty: 3 TAB | Refills: 0 | Status: SHIPPED | OUTPATIENT
Start: 2018-04-19

## 2018-04-19 RX ORDER — PREDNISONE 50 MG/1
TABLET ORAL
Qty: 3 TAB | Refills: 0 | Status: SHIPPED | OUTPATIENT
Start: 2018-04-19 | End: 2018-04-19 | Stop reason: SDUPTHER

## 2018-04-19 RX ORDER — RANITIDINE 150 MG/1
TABLET, FILM COATED ORAL
Qty: 2 TAB | Refills: 0 | Status: SHIPPED | OUTPATIENT
Start: 2018-04-19

## 2018-04-20 ENCOUNTER — TELEPHONE (OUTPATIENT)
Dept: VASCULAR SURGERY | Age: 73
End: 2018-04-20

## 2018-04-20 NOTE — TELEPHONE ENCOUNTER
Called the patient and advised to arrive no earlier than 0630, arrive at radiology registration, Heart and bp meds with small sip of water and the bring the rest of the dye prep with her when she comes to procedure. Advised to be NPO after midnight except for meds. Pt verbalized understanding.

## 2018-04-23 ENCOUNTER — HOSPITAL ENCOUNTER (OUTPATIENT)
Dept: INTERVENTIONAL RADIOLOGY/VASCULAR | Age: 73
Discharge: HOME | End: 2018-04-23
Attending: SURGERY | Admitting: SURGERY
Payer: MEDICARE

## 2018-04-23 VITALS
BODY MASS INDEX: 25.35 KG/M2 | DIASTOLIC BLOOD PRESSURE: 72 MMHG | OXYGEN SATURATION: 99 % | TEMPERATURE: 98.8 F | RESPIRATION RATE: 20 BRPM | WEIGHT: 148.5 LBS | HEART RATE: 68 BPM | HEIGHT: 64 IN | SYSTOLIC BLOOD PRESSURE: 169 MMHG

## 2018-04-23 DIAGNOSIS — I87.1: ICD-10-CM

## 2018-04-23 LAB
ANION GAP SERPL CALC-SCNC: 13 MMOL/L (ref 3–18)
APTT PPP: 24.7 SEC (ref 23–36.4)
BASOPHILS # BLD: 0 K/UL (ref 0–0.06)
BASOPHILS NFR BLD: 0 % (ref 0–2)
BUN SERPL-MCNC: 20 MG/DL (ref 7–18)
BUN/CREAT SERPL: 16 (ref 12–20)
CALCIUM SERPL-MCNC: 9.2 MG/DL (ref 8.5–10.1)
CHLORIDE SERPL-SCNC: 102 MMOL/L (ref 100–108)
CO2 SERPL-SCNC: 25 MMOL/L (ref 21–32)
CREAT SERPL-MCNC: 1.23 MG/DL (ref 0.6–1.3)
DIFFERENTIAL METHOD BLD: ABNORMAL
EOSINOPHIL # BLD: 0 K/UL (ref 0–0.4)
EOSINOPHIL NFR BLD: 0 % (ref 0–5)
ERYTHROCYTE [DISTWIDTH] IN BLOOD BY AUTOMATED COUNT: 12.9 % (ref 11.6–14.5)
GLUCOSE SERPL-MCNC: 237 MG/DL (ref 74–99)
HCT VFR BLD AUTO: 37.2 % (ref 35–45)
HGB BLD-MCNC: 12.5 G/DL (ref 12–16)
INR PPP: 1 (ref 0.8–1.2)
LYMPHOCYTES # BLD: 1 K/UL (ref 0.9–3.6)
LYMPHOCYTES NFR BLD: 8 % (ref 21–52)
MCH RBC QN AUTO: 30.8 PG (ref 24–34)
MCHC RBC AUTO-ENTMCNC: 33.6 G/DL (ref 31–37)
MCV RBC AUTO: 91.6 FL (ref 74–97)
MONOCYTES # BLD: 0.1 K/UL (ref 0.05–1.2)
MONOCYTES NFR BLD: 1 % (ref 3–10)
NEUTS SEG # BLD: 10.5 K/UL (ref 1.8–8)
NEUTS SEG NFR BLD: 91 % (ref 40–73)
PLATELET # BLD AUTO: 206 K/UL (ref 135–420)
PMV BLD AUTO: 10.2 FL (ref 9.2–11.8)
POTASSIUM SERPL-SCNC: 3.9 MMOL/L (ref 3.5–5.5)
PROTHROMBIN TIME: 12.7 SEC (ref 11.5–15.2)
RBC # BLD AUTO: 4.06 M/UL (ref 4.2–5.3)
SODIUM SERPL-SCNC: 140 MMOL/L (ref 136–145)
WBC # BLD AUTO: 11.6 K/UL (ref 4.6–13.2)

## 2018-04-23 PROCEDURE — 85730 THROMBOPLASTIN TIME PARTIAL: CPT | Performed by: SURGERY

## 2018-04-23 PROCEDURE — 99152 MOD SED SAME PHYS/QHP 5/>YRS: CPT

## 2018-04-23 PROCEDURE — 74011250636 HC RX REV CODE- 250/636: Performed by: SURGERY

## 2018-04-23 PROCEDURE — 85610 PROTHROMBIN TIME: CPT | Performed by: SURGERY

## 2018-04-23 PROCEDURE — 85025 COMPLETE CBC W/AUTO DIFF WBC: CPT | Performed by: SURGERY

## 2018-04-23 PROCEDURE — 99153 MOD SED SAME PHYS/QHP EA: CPT

## 2018-04-23 PROCEDURE — 74011250637 HC RX REV CODE- 250/637: Performed by: SURGERY

## 2018-04-23 PROCEDURE — 80048 BASIC METABOLIC PNL TOTAL CA: CPT | Performed by: SURGERY

## 2018-04-23 PROCEDURE — 74011636320 HC RX REV CODE- 636/320: Performed by: SURGERY

## 2018-04-23 PROCEDURE — C1769 GUIDE WIRE: HCPCS

## 2018-04-23 RX ORDER — FENTANYL CITRATE 50 UG/ML
25-200 INJECTION, SOLUTION INTRAMUSCULAR; INTRAVENOUS
Status: DISCONTINUED | OUTPATIENT
Start: 2018-04-23 | End: 2018-04-23 | Stop reason: HOSPADM

## 2018-04-23 RX ORDER — MELATONIN
DAILY
COMMUNITY

## 2018-04-23 RX ORDER — SODIUM CHLORIDE 9 MG/ML
25 INJECTION, SOLUTION INTRAVENOUS CONTINUOUS
Status: DISCONTINUED | OUTPATIENT
Start: 2018-04-23 | End: 2018-04-23 | Stop reason: HOSPADM

## 2018-04-23 RX ORDER — HEPARIN SODIUM 1000 [USP'U]/ML
10000 INJECTION, SOLUTION INTRAVENOUS; SUBCUTANEOUS
Status: DISCONTINUED | OUTPATIENT
Start: 2018-04-23 | End: 2018-04-23 | Stop reason: HOSPADM

## 2018-04-23 RX ORDER — MIDAZOLAM HYDROCHLORIDE 1 MG/ML
.5-4 INJECTION, SOLUTION INTRAMUSCULAR; INTRAVENOUS
Status: DISCONTINUED | OUTPATIENT
Start: 2018-04-23 | End: 2018-04-23 | Stop reason: HOSPADM

## 2018-04-23 RX ORDER — LIDOCAINE HYDROCHLORIDE 10 MG/ML
1-10 INJECTION, SOLUTION EPIDURAL; INFILTRATION; INTRACAUDAL; PERINEURAL ONCE
Status: COMPLETED | OUTPATIENT
Start: 2018-04-23 | End: 2018-04-23

## 2018-04-23 RX ORDER — OXYCODONE AND ACETAMINOPHEN 5; 325 MG/1; MG/1
2 TABLET ORAL
Status: DISCONTINUED | OUTPATIENT
Start: 2018-04-23 | End: 2018-04-23 | Stop reason: HOSPADM

## 2018-04-23 RX ORDER — SODIUM CHLORIDE 9 MG/ML
50 INJECTION, SOLUTION INTRAVENOUS CONTINUOUS
Status: DISCONTINUED | OUTPATIENT
Start: 2018-04-23 | End: 2018-04-23 | Stop reason: HOSPADM

## 2018-04-23 RX ORDER — HEPARIN SODIUM 200 [USP'U]/100ML
1000 INJECTION, SOLUTION INTRAVENOUS
Status: DISCONTINUED | OUTPATIENT
Start: 2018-04-23 | End: 2018-04-23 | Stop reason: HOSPADM

## 2018-04-23 RX ORDER — NALOXONE HYDROCHLORIDE 0.4 MG/ML
0.1 INJECTION, SOLUTION INTRAMUSCULAR; INTRAVENOUS; SUBCUTANEOUS AS NEEDED
Status: DISCONTINUED | OUTPATIENT
Start: 2018-04-23 | End: 2018-04-23 | Stop reason: HOSPADM

## 2018-04-23 RX ORDER — FLUMAZENIL 0.1 MG/ML
0.2 INJECTION INTRAVENOUS
Status: DISCONTINUED | OUTPATIENT
Start: 2018-04-23 | End: 2018-04-23 | Stop reason: HOSPADM

## 2018-04-23 RX ADMIN — FENTANYL CITRATE 25 MCG: 50 INJECTION, SOLUTION INTRAMUSCULAR; INTRAVENOUS at 08:09

## 2018-04-23 RX ADMIN — OXYCODONE HYDROCHLORIDE AND ACETAMINOPHEN 2 TABLET: 5; 325 TABLET ORAL at 11:25

## 2018-04-23 RX ADMIN — IOPAMIDOL 40 ML: 510 INJECTION, SOLUTION INTRAVASCULAR at 08:33

## 2018-04-23 RX ADMIN — MIDAZOLAM HYDROCHLORIDE 0.5 MG: 1 INJECTION, SOLUTION INTRAMUSCULAR; INTRAVENOUS at 08:13

## 2018-04-23 RX ADMIN — LIDOCAINE HYDROCHLORIDE 10 ML: 10 INJECTION, SOLUTION EPIDURAL; INFILTRATION; INTRACAUDAL; PERINEURAL at 08:33

## 2018-04-23 RX ADMIN — SODIUM CHLORIDE 25 ML/HR: 900 INJECTION, SOLUTION INTRAVENOUS at 07:28

## 2018-04-23 RX ADMIN — FENTANYL CITRATE 25 MCG: 50 INJECTION, SOLUTION INTRAMUSCULAR; INTRAVENOUS at 08:21

## 2018-04-23 RX ADMIN — FENTANYL CITRATE 25 MCG: 50 INJECTION, SOLUTION INTRAMUSCULAR; INTRAVENOUS at 08:13

## 2018-04-23 RX ADMIN — MIDAZOLAM HYDROCHLORIDE 1 MG: 1 INJECTION, SOLUTION INTRAMUSCULAR; INTRAVENOUS at 08:05

## 2018-04-23 NOTE — H&P (VIEW-ONLY)
[address]    Chief Complaint   Patient presents with    Leg Pain       History and Physical    Ms. Jass Dawson is a 81year old female referred to our office for evaluation of possible high grade left subclavian artery stenosis. Ms. Jass Dawson has a previous history of a CABG with vein grafts per the patient. She initially does not have any complaints in regards to her arms, but after further questioning she does note that both her arms get fatigued easily and she states maybe her left is worse than her right. She is a former  and did a lot of repetitive movement with her arms and shoulders. She is a non smoker and is not diabetic. She denies dizziness or syncope with arm use. Past Medical History:   Diagnosis Date    Anemia     Arrhythmia     CAD (coronary artery disease)     Clostridium difficile diarrhea     Hypercholesterolemia     Hypertension     Thromboembolus [facility])      Patient Active Problem List   Diagnosis Code    CHF (congestive heart failure) (formerly [facility]) I50.9    Peripheral arterial disease (formerly [facility]) I73.9     Past Surgical History:   Procedure Laterality Date    CARDIAC SURG PROCEDURE UNLIST      HX CHOLECYSTECTOMY      HX COLONOSCOPY      HX CORONARY ARTERY BYPASS GRAFT      HX HYSTERECTOMY      HX ORTHOPAEDIC      foot surgery     RT & LT HEART WITH C.O.  3/17/2018    JOANNE CORONARY ARTERIOGRAPH  3/17/2018     Current Outpatient Prescriptions   Medication Sig Dispense Refill    CALCIUM CARB/VIT D3/MINERALS (CALCIUM CARBONATE-VIT D3-MIN) 600 mg (1,500 mg)-400 unit chew Take  by mouth.  acetaminophen (TYLENOL) 650 mg TbER Take 650 mg by mouth every eight (8) hours.  cholecalciferol, vitamin D3, (VITAMIN D3) 2,000 unit tab Take  by mouth.  VIT B COMPLEX 100 COMBO NO.2 PO Take  by mouth.  magnesium oxide (MAG-OX) 400 mg tablet Take 400 mg by mouth daily.  metoprolol (LOPRESSOR) 25 mg tablet Take 1 Tab by mouth every twelve (12) hours.  36 Tab 0    potassium chloride (KLOR-CON) 20 mEq packet Take 20 mEq by mouth two (2) times a day.  torsemide (DEMADEX) 5 mg tablet Take 5 mg by mouth daily. As needed  Indications: Edema      coenzyme q10-vitamin e (COQ10 ) 100-100 mg-unit cap Take  by mouth.  aspirin delayed-release 325 mg tablet Take  by mouth every six (6) hours as needed for Pain.  atorvastatin (LIPITOR) 80 mg tablet Take 80 mg by mouth daily.  FLUoxetine (PROZAC) 20 mg capsule Take 20 mg by mouth daily.  l-methylfolate (L-METHYLFOLATE) 7.5 mg tab Take  by mouth.  nitroglycerin (NITROSTAT) 0.4 mg SL tablet 0.4 mg by SubLINGual route every five (5) minutes as needed for Chest Pain.  isosorbide dinitrate (ISORDIL) 40 mg tablet Take 30 mg by mouth daily. Allergies   Allergen Reactions    Contrast Dye [Iodine] Anaphylaxis    Compazine [Prochlorperazine Edisylate] Other (comments)     Tries to swallow tongue? Social History     Social History    Marital status:      Spouse name: N/A    Number of children: N/A    Years of education: N/A     Occupational History    Not on file. Social History Main Topics    Smoking status: Never Smoker    Smokeless tobacco: Never Used    Alcohol use No    Drug use: No    Sexual activity: Not Currently     Other Topics Concern    Not on file     Social History Narrative      Family History   Problem Relation Age of Onset    Diabetes Mother     Heart Disease Mother     Hypertension Mother     Heart Disease Father     Diabetes Father     Hypertension Father     Stroke Father     Lung Disease Father     Hypertension Brother     Diabetes Brother     Heart Disease Brother        Review of Systems    Review of Systems   Constitutional: Negative for chills, diaphoresis, fever, malaise/fatigue and weight loss. HENT: Negative for hearing loss and sore throat. Eyes: Negative for blurred vision, photophobia and redness.    Respiratory: Positive for shortness of breath. Negative for cough, hemoptysis and wheezing. Cardiovascular: Negative for chest pain, palpitations and orthopnea. Gastrointestinal: Negative for abdominal pain, blood in stool, constipation, diarrhea, heartburn, nausea and vomiting. Genitourinary: Negative for dysuria, frequency, hematuria and urgency. Musculoskeletal: Negative for back pain and myalgias. Skin: Negative for itching and rash. Neurological: Negative for dizziness, speech change, focal weakness, weakness and headaches. Endo/Heme/Allergies: Does not bruise/bleed easily. Psychiatric/Behavioral: Negative for depression and suicidal ideas. Physical Exam:    Visit Vitals    /70  Comment: right arm    Pulse 76    Resp 18    Ht 5' 4\" (1.626 m)    Wt 149 lb (67.6 kg)    BMI 25.58 kg/m2      Physical Examination: General appearance - alert, well appearing, and in no distress  Mental status - alert, oriented to person, place, and time  Eyes - sclera anicteric, left eye normal, right eye normal  Ears - right ear normal, left ear normal  Nose - normal and patent, no erythema, discharge or polyps  Mouth - mucous membranes moist, pharynx normal without lesions  Neck - Soft left carotid bruit  Lymphatics - no palpable lymphadenopathy  Chest - clear to auscultation, no wheezes, rales or rhonchi, symmetric air entry  Heart - normal rate and regular rhythm  Abdomen - soft, nontender, nondistended, no masses or organomegaly  Extremities - 2+ radial pulse bilaterally. No change in pulse with movement      Impression and Plan:    ICD-10-CM ICD-9-CM    1.  Peripheral arterial disease (HCC) I73.9 443.9 CBC W/O DIFF      METABOLIC PANEL, COMPREHENSIVE      PROTHROMBIN TIME + INR     Orders Placed This Encounter    CBC W/O DIFF    METABOLIC PANEL, COMPREHENSIVE    PROTHROMBIN TIME + INR    CALCIUM CARB/VIT D3/MINERALS (CALCIUM CARBONATE-VIT D3-MIN) 600 mg (1,500 mg)-400 unit chew     I reviewed the images from Ms. Pardo's CTA in February of this year at [facility] and she has moderate stenosis on these images at most.  However, by report she has near occlusion of her subclavian artery on her CTA in March. Additionally, today in our office her SBP is 20-30 mmHg lower on the left than the right, suggestive of a significant stenosis. If she was asymptomatic I would recommend observation. However, this vague history of arm fatigue could in theory be due to this stenosis. I have offered an angiogram with a possible stenting of this stenosis. We discussed the risk of embolization not only to the hand but also to the vertebral artery. We also discussed that if her arm fatigue is not due to her perfusion then she may not note any change in her symptoms. Ms Miya Escobedo has voiced understanding and has decided to proceed with an angiogram.  We will schedule this for as soon as feasible. Follow-up Disposition:  Return for post angiogram.      The treatment plan was reviewed with the patient in detail. The patient voiced understanding of this plan and all questions and concerns were addressed. The patient agrees with this plan. We discussed the signs and symptoms that would require earlier attention or intervention. The patient was given educational material related to his/her visit and the patient has voiced understanding of the material.     I appreciate the opportunity to participate in the care of your patient. I will be sure to keep you informed of any subsequent changes in the treatment plan. If you have any questions or concerns, please feel free to contact me. Lillie Sanders MD    PLEASE NOTE:  This document has been produced using voice recognition software. Unrecognized errors in transcription may be present.

## 2018-04-23 NOTE — DISCHARGE INSTRUCTIONS
Angiogram: What to Expect at Home  Your Recovery  An angiogram is an X-ray test that uses dye and a camera to take pictures of the blood flow in an artery or a vein. The doctor inserted a thin, flexible tube (catheter) into a blood vessel in your groin. In some cases, the catheter is placed in a blood vessel in the arm. An angiogram is done for many reasons. For example, you may have an angiogram to find the source of bleeding, such as an ulcer. Or it may be done to look for blocked blood vessels in your lungs. After an angiogram, your groin or arm may have a bruise and feel sore for a day or two. You can do light activities around the house but nothing strenuous for several days. Your doctor may give you specific instructions on when you can do your normal activities again, such as driving and going back to work. This care sheet gives you a general idea about how long it will take for you to recover. But each person recovers at a different pace. Follow the steps below to feel better as quickly as possible. How can you care for yourself at home? Activity  ? · Do not do strenuous exercise and do not lift, pull, or push anything heavy until your doctor says it is okay. This may be for a day or two. You can walk around the house and do light activity, such as cooking. ? · You may shower 24 to 48 hours after the procedure, if your doctor okays it. Pat the incision dry. Do not take a bath for 1 week, or until your doctor tells you it is okay. ? · If the catheter was placed in your groin, try not to walk up stairs for the first couple of days. ? · If the catheter was placed in your arm near your wrist, do not bend your wrist deeply for the first couple of days. Be careful using your hand to get into and out of a chair or bed. ? · If your doctor recommends it, get more exercise. Walking is a good choice. Bit by bit, increase the amount you walk every day.  Try for at least 30 minutes on most days of the week.   Diet  ? · Drink plenty of fluids to help your body flush out the dye. If you have kidney, heart, or liver disease and have to limit fluids, talk with your doctor before you increase the amount of fluids you drink. ? · You can eat your normal diet. If your stomach is upset, try bland, low-fat foods like plain rice, broiled chicken, toast, and yogurt. Medicines  ? · Be safe with medicines. Read and follow all instructions on the label. ¨ If the doctor gave you a prescription medicine for pain, take it as prescribed. ¨ If you are not taking a prescription pain medicine, ask your doctor if you can take an over-the-counter medicine. ? · If you take blood thinners, such as warfarin (Coumadin), clopidogrel (Plavix), or aspirin, be sure to talk to your doctor. He or she will tell you if and when to start taking those medicines again. Make sure that you understand exactly what your doctor wants you to do.   ? · Your doctor will tell you if and when you can restart your medicines. He or she will also give you instructions about taking any new medicines. ?Care of the catheter site  ? · You will have a dressing over the cut (incision). A dressing helps the incision heal and protects it. Your doctor will tell you how to take care of this. ? · Put ice or a cold pack on the area for 10 to 20 minutes at a time to help with soreness or swelling. Put a thin cloth between the ice and your skin. Follow-up care is a key part of your treatment and safety. Be sure to make and go to all appointments, and call your doctor if you are having problems. It's also a good idea to know your test results and keep a list of the medicines you take. When should you call for help? Call 911 anytime you think you may need emergency care. For example, call if:  ? · You passed out (lost consciousness). ? · You have severe trouble breathing. ? · You have sudden chest pain and shortness of breath, or you cough up blood.    ?Call your doctor now or seek immediate medical care if:  ? · You are bleeding from the area where the catheter was put in your artery. ? · You have a fast-growing, painful lump at the catheter site. ? · You have signs of infection, such as:  ¨ Increased pain, swelling, warmth, or redness. ¨ Red streaks leading from the incision. ¨ Pus draining from the incision. ¨ A fever. ? Watch closely for any changes in your health, and be sure to contact your doctor if:  ? · You don't get better as expected. Where can you learn more? Go to http://clayton-adin.info/. Enter Z343 in the search box to learn more about \"Angiogram: What to Expect at Home. \"  Current as of: October 14, 2016  Content Version: 11.4  © 7021-7233 [employer]. Care instructions adapted under license by [employer] (which disclaims liability or warranty for this information). If you have questions about a medical condition or this instruction, always ask your healthcare professional. [place] [address] any warranty or liability for your use of this information. Sedation for a Medical Procedure: Care Instructions  Your Care Instructions    For a minor procedure or surgery, you will get a sedative to help you relax. This drug will make you sleepy. It is usually given in a vein (by IV). A shot may also be used to numb the area. If you had local anesthesia, you may feel some pain and discomfort as it wears off. If you have pain, don't be afraid to say so. Pain medicine works better if you take it before the pain gets bad. Common side effects from sedation include:  · Feeling sleepy. (Your doctors and nurses will make sure you are not too sleepy to go home.)  · Nausea and vomiting. This usually does not last long. · Feeling tired. Follow-up care is a key part of your treatment and safety. Be sure to make and go to all appointments, and call your doctor if you are having problems.  It's also a good idea to know your test results and keep a list of the medicines you take. How can you care for yourself at home? Activity  ? · Don't do anything for 24 hours that requires attention to detail. It takes time for the medicine effects to completely wear off.   ? · For your safety, you should not drive or operate any machinery that could be dangerous until the medicine wears off and you can think clearly and react easily. ? · Rest when you feel tired. Getting enough sleep will help you recover. Diet  ? · You can eat your normal diet, unless your doctor gives you other instructions. If your stomach is upset, try clear liquids and bland, low-fat foods like plain toast or rice. ? · Drink plenty of fluids (unless your doctor tells you not to). ? · Don't drink alcohol for 24 hours. Medicines  ? · Be safe with medicines. Read and follow all instructions on the label. ¨ If the doctor gave you a prescription medicine for pain, take it as prescribed. ¨ If you are not taking a prescription pain medicine, ask your doctor if you can take an over-the-counter medicine. ? · If you think your pain medicine is making you sick to your stomach:  ¨ Take your medicine after meals (unless your doctor has told you not to). ¨ Ask your doctor for a different pain medicine. When should you call for help? Call 911 anytime you think you may need emergency care. For example, call if:  ? · You have severe trouble breathing. ? · You passed out (lost consciousness). ?Call your doctor now or seek immediate medical care if:  ? · You have trouble breathing. ? · You have ongoing or worsening nausea or vomiting. ? · You have a fever. ? · You have a new or worse headache. ? · The medicine is not wearing off and you can't think clearly. ? Watch closely for changes in your health, and be sure to contact your doctor if:  ? · You do not get better as expected. Where can you learn more?   Go to http://clayton-adin.info/. Enter T880 in the search box to learn more about \"Sedation for a Medical Procedure: Care Instructions. \"  Current as of: August 14, 2016  Content Version: 11.4  © 5686-5756 [employer]. Care instructions adapted under license by [employer] (which disclaims liability or warranty for this information). If you have questions about a medical condition or this instruction, always ask your healthcare professional. [facility] any warranty or liability for your use of this information. DISCHARGE SUMMARY from Nurse    PATIENT INSTRUCTIONS:    After general anesthesia or intravenous sedation, for 24 hours or while taking prescription Narcotics:  · Limit your activities  · Do not drive and operate hazardous machinery  · Do not make important personal or business decisions  · Do  not drink alcoholic beverages  · If you have not urinated within 8 hours after discharge, please contact your surgeon on call. Report the following to your surgeon:  · Excessive pain, swelling, redness or odor of or around the surgical area  · Temperature over 100.5  · Nausea and vomiting lasting longer than 4 hours or if unable to take medications  · Any signs of decreased circulation or nerve impairment to extremity: change in color, persistent  numbness, tingling, coldness or increase pain  · Any questions    What to do at Home:  Recommended activity: Activity as tolerated,         *  Please give a list of your current medications to your Primary Care Provider. *  Please update this list whenever your medications are discontinued, doses are      changed, or new medications (including over-the-counter products) are added. *  Please carry medication information at all times in case of emergency situations.     These are general instructions for a healthy lifestyle:    No smoking/ No tobacco products/ Avoid exposure to second hand smoke  Surgeon General's Warning:  Quitting smoking now greatly reduces serious risk to your health. Obesity, smoking, and sedentary lifestyle greatly increases your risk for illness    A healthy diet, regular physical exercise & weight monitoring are important for maintaining a healthy lifestyle    You may be retaining fluid if you have a history of heart failure or if you experience any of the following symptoms:  Weight gain of 3 pounds or more overnight or 5 pounds in a week, increased swelling in our hands or feet or shortness of breath while lying flat in bed. Please call your doctor as soon as you notice any of these symptoms; do not wait until your next office visit. Recognize signs and symptoms of STROKE:    F-face looks uneven    A-arms unable to move or move unevenly    S-speech slurred or non-existent    T-time-call 911 as soon as signs and symptoms begin-DO NOT go       Back to bed or wait to see if you get better-TIME IS BRAIN. Warning Signs of HEART ATTACK     Call 911 if you have these symptoms:   Chest discomfort. Most heart attacks involve discomfort in the center of the chest that lasts more than a few minutes, or that goes away and comes back. It can feel like uncomfortable pressure, squeezing, fullness, or pain.  Discomfort in other areas of the upper body. Symptoms can include pain or discomfort in one or both arms, the back, neck, jaw, or stomach.  Shortness of breath with or without chest discomfort.  Other signs may include breaking out in a cold sweat, nausea, or lightheadedness. Don't wait more than five minutes to call 911 - MINUTES MATTER! Fast action can save your life. Calling 911 is almost always the fastest way to get lifesaving treatment. Emergency Medical Services staff can begin treatment when they arrive -- up to an hour sooner than if someone gets to the hospital by car. The discharge information has been reviewed with the patient and spouse.   The patient and spouse verbalized understanding. Discharge medications reviewed with the patient and spouse and appropriate educational materials and side effects teaching were provided.     Patient armband removed and shredded    ___________________________________________________________________________________________________________________________________

## 2018-04-23 NOTE — PROGRESS NOTES
Pt back to unit S/P angiogram. Rt groin accessed, angiocil closure and Tegaderm in place, dry and intact. PT and spouse educated on bedrest for 2 hours flat and verbalized all understandings. 28 Pt discharged home in the care of spouse. No c/o pain upon discharge. All discharge instructions verbalized well.

## 2018-04-23 NOTE — PROGRESS NOTES
Received patient from care unit. Prepped and ready for consent. Completed and patient transferred to table. Placed on monitor, iv infusing and all signals in lower extremities present.

## 2018-04-23 NOTE — OP NOTES
[address] REPORT    Danilo Castillo  MR#: [id number]  : 1945  ACCOUNT #: [de-identified]   DATE OF SERVICE: 2018    PREOPERATIVE DIAGNOSIS:  Left subclavian artery stenosis with atherosclerosis and bilateral arm pain. POSTOPERATIVE DIAGNOSIS:  Left subclavian artery stenosis with atherosclerosis and bilateral arm pain. PROCEDURES PERFORMED:    1. Ultrasound-guided percutaneous access of right common femoral artery. 2.  Nonselective catheterization of the thoracic aorta. 3.  Thoracic aortogram.  4.  First order catheterization of the left subclavian artery. 5.  Left subclavian artery angiogram and pressure gradient. SURGEON:  Cristopher Berg MD    ASSISTANT:  None    ANESTHESIA:  Local MAC. ESTIMATED BLOOD LOSS:  Minima    DRAINS:  None. IMPLANTS:  None. SPECIMENS REMOVED:  None. COMPLICATIONS:  None. CONDITION:  Returned to recovery, stable. FINDINGS:  No evidence of a significant pressure gradient or high-grade stenosis within the subclavian artery. INDICATION FOR PROCEDURE:  The patient is a 27-year-old female with peripheral arterial disease and history of coronary artery disease, presented with bilateral shoulder pain. The patient was noted to have a 20-30 mmHg difference in the blood pressure in the left and right arm, with left arm being lower, as well as a CT scan showing atherosclerosis and possible high-grade stenosis of subclavian artery origin. The patient complained of pain with use of her arm and given the findings, decision was made to perform an angiogram.  Informed consent was obtained. PROCEDURE IN DETAIL:  On 2018, the patient was identified by name and ID bracelet by myself and the entire operative team.  Once done, the patient was placed on the catheterization table in the supine position with the left arm extended and supported. She was prepped and draped and timeout performed at this point. Using ultrasound, the right common femoral artery was interrogated. It was pulsatile and patent, and appeared to be the appropriate site for access. Ultrasound-guided percutaneous access of the right common femoral artery was then obtained. We advanced a Bentson wire followed by a 5-[place] citizen sheath into the femoral artery and up into the arch. We then performed an arch angiogram which showed diffuse plaque throughout the left subclavian artery, but no evidence of any high-grade stenosis. We then advanced an end-hole catheter across the origin and performed a pressure gradient, which was around 9-10, not significant. Given these findings, we decided not to treat. We then removed the wire and sheath and used a 6-[place] citizen Angio-Seal to close our puncture site at the end of the procedure. I was present and scrubbed for the entire procedure.       MD Morenita Hall / MAIK  D: 04/23/2018 08:41     T: 04/23/2018 11:32  JOB #: [id number]

## 2018-04-23 NOTE — INTERVAL H&P NOTE
H&P Update:  Hedy Palencia was seen and examined. History and physical has been reviewed. The patient has been examined.  There have been no significant clinical changes since the completion of the originally dated History and Physical.    Signed By: Chace Jay MD     April 23, 2018 7:38 AM

## 2018-04-23 NOTE — IP AVS SNAPSHOT
[facility] 
 
 
 [address] [address] 
[phone] Patient: Theresa Chris MRN: [id number] :1945 About your hospitalization You were admitted on:  2018 You last received care in the:  [address] You were discharged on:  2018 Why you were hospitalized Your primary diagnosis was:  Not on File Follow-up Information Follow up With Details Comments Contact Info Anaid Padgett. Anjelica Briones, [address] [facility] [address] [facility] [address] [address] 
[phone] Your Scheduled Appointments Tuesday May 01, 2018 11:00 AM EDT HOSPITAL DISCHARGE with Marion Murphy MD  
BS Vein/Vascular Spec THE [facility] ([address])  
 [address] 1700 [facility]  
[phone] Discharge Orders None A check nettie indicates which time of day the medication should be taken. My Medications CONTINUE taking these medications Instructions Each Dose to Equal  
 Morning Noon Evening Bedtime  
 acetaminophen 650 mg Tber Commonly known as:  TYLENOL Your last dose was: Your next dose is: Take 650 mg by mouth every eight (8) hours. 650 mg  
    
   
   
   
  
 aspirin delayed-release 325 mg tablet Your last dose was: Your next dose is: Take  by mouth every six (6) hours as needed for Pain. Calcium Carbonate-Vit D3-Min 600 mg (1,500 mg)-400 unit Chew Your last dose was: Your next dose is: Take  by mouth. COQ10  100-100 mg-unit Cap Generic drug:  coenzyme q10-vitamin e Your last dose was: Your next dose is: Take  by mouth. diphenhydrAMINE 50 mg tablet Commonly known as:  BENADRYL Your last dose was: Your next dose is: Take one tab by mouth 7hrs and one hr prior to procedure. LIPITOR 80 mg tablet Generic drug:  atorvastatin Your last dose was: Your next dose is: Take 80 mg by mouth daily. 80 mg  
    
   
   
   
  
 magnesium oxide 400 mg tablet Commonly known as:  MAG-OX Your last dose was: Your next dose is: Take 400 mg by mouth daily. 400 mg  
    
   
   
   
  
 metoprolol tartrate 25 mg tablet Commonly known as:  LOPRESSOR Your last dose was: Your next dose is: Take 1 Tab by mouth every twelve (12) hours. 25 mg  
    
   
   
   
  
 nitroglycerin 0.4 mg SL tablet Commonly known as:  NITROSTAT Your last dose was: Your next dose is: 0.4 mg by SubLINGual route every five (5) minutes as needed for Chest Pain. 0.4 mg  
    
   
   
   
  
 potassium chloride 20 mEq packet Commonly known as:  KLOR-CON Your last dose was: Your next dose is: Take 20 mEq by mouth two (2) times a day. 20 mEq * predniSONE 10 mg tablet Commonly known as:  Laurina Sax Your last dose was: Your next dose is: Take 10 mg by mouth daily. Indications: idiopathic pulmonary fibrosis 10 mg  
    
   
   
   
  
 * predniSONE 50 mg tablet Commonly known as:  Laurina Sax Your last dose was: Your next dose is: One tab by mouth 13hrs, 7hrs and One hr prior to procedure. PROzac 20 mg capsule Generic drug:  FLUoxetine Your last dose was: Your next dose is: Take 20 mg by mouth daily. 20 mg  
    
   
   
   
  
 raNITIdine 150 mg tablet Commonly known as:  ZANTAC Your last dose was: Your next dose is: Take one tab by mouth 7hrs and one hr prior to procedure. torsemide 5 mg tablet Commonly known as:  DEMADEX Your last dose was: Your next dose is: Take 5 mg by mouth daily. As needed  Indications: Edema  
 5 mg VIT B COMPLEX 100 COMBO NO.2 PO Your last dose was: Your next dose is: Take  by mouth. VITAMIN D3 1,000 unit tablet Generic drug:  cholecalciferol Your last dose was: Your next dose is: Take  by mouth daily. * Notice: This list has 2 medication(s) that are the same as other medications prescribed for you. Read the directions carefully, and ask your doctor or other care provider to review them with you. Discharge Instructions Angiogram: What to Expect at [facility] Your Recovery An angiogram is an X-ray test that uses dye and a camera to take pictures of the blood flow in an artery or a vein. The doctor inserted a thin, flexible tube (catheter) into a blood vessel in your groin. In some cases, the catheter is placed in a blood vessel in the arm. An angiogram is done for many reasons. For example, you may have an angiogram to find the source of bleeding, such as an ulcer. Or it may be done to look for blocked blood vessels in your lungs. After an angiogram, your groin or arm may have a bruise and feel sore for a day or two. You can do light activities around the house but nothing strenuous for several days. Your doctor may give you specific instructions on when you can do your normal activities again, such as driving and going back to work. This care sheet gives you a general idea about how long it will take for you to recover. But each person recovers at a different pace. Follow the steps below to feel better as quickly as possible. How can you care for yourself at home? Activity ?  · Do not do strenuous exercise and do not lift, pull, or push anything heavy until your doctor says it is okay. This may be for a day or two. You can walk around the house and do light activity, such as cooking. ? · You may shower 24 to 48 hours after the procedure, if your doctor okays it. Pat the incision dry. Do not take a bath for 1 week, or until your doctor tells you it is okay. ? · If the catheter was placed in your groin, try not to walk up stairs for the first couple of days. ? · If the catheter was placed in your arm near your wrist, do not bend your wrist deeply for the first couple of days. Be careful using your hand to get into and out of a chair or bed. ? · If your doctor recommends it, get more exercise. Walking is a good choice. Bit by bit, increase the amount you walk every day. Try for at least 30 minutes on most days of the week. Diet ? · Drink plenty of fluids to help your body flush out the dye. If you have kidney, heart, or liver disease and have to limit fluids, talk with your doctor before you increase the amount of fluids you drink. ? · You can eat your normal diet. If your stomach is upset, try bland, low-fat foods like plain rice, broiled chicken, toast, and yogurt. Medicines ? · Be safe with medicines. Read and follow all instructions on the label. ¨ If the doctor gave you a prescription medicine for pain, take it as prescribed. ¨ If you are not taking a prescription pain medicine, ask your doctor if you can take an over-the-counter medicine. ? · If you take blood thinners, such as warfarin (Coumadin), clopidogrel (Plavix), or aspirin, be sure to talk to your doctor. He or she will tell you if and when to start taking those medicines again. Make sure that you understand exactly what your doctor wants you to do.  
? · Your doctor will tell you if and when you can restart your medicines. He or she will also give you instructions about taking any new medicines. ?Care of the catheter site ? · You will have a dressing over the cut (incision). A dressing helps the incision heal and protects it. Your doctor will tell you how to take care of this. ? · Put ice or a cold pack on the area for 10 to 20 minutes at a time to help with soreness or swelling. Put a thin cloth between the ice and your skin. Follow-up care is a key part of your treatment and safety. Be sure to make and go to all appointments, and call your doctor if you are having problems. It's also a good idea to know your test results and keep a list of the medicines you take. When should you call for help? Call 911 anytime you think you may need emergency care. For example, call if: 
? · You passed out (lost consciousness). ? · You have severe trouble breathing. ? · You have sudden chest pain and shortness of breath, or you cough up blood. ?Call your doctor now or seek immediate medical care if: 
? · You are bleeding from the area where the catheter was put in your artery. ? · You have a fast-growing, painful lump at the catheter site. ? · You have signs of infection, such as: 
¨ Increased pain, swelling, warmth, or redness. ¨ Red streaks leading from the incision. ¨ Pus draining from the incision. ¨ A fever. ? Watch closely for any changes in your health, and be sure to contact your doctor if: 
? · You don't get better as expected. Where can you learn more? Go to http://clayton-adin.info/. Enter N624 in the search box to learn more about \"Angiogram: What to Expect at Home. \" Current as of: October 14, 2016 Content Version: 11.4 © 2740-0354 Comply7. Care instructions adapted under license by ReSnap (which disclaims liability or warranty for this information). If you have questions about a medical condition or this instruction, always ask your healthcare professional. [address] any warranty or liability for your use of this information. Sedation for a Medical Procedure: Care Instructions Your Care Instructions For a minor procedure or surgery, you will get a sedative to help you relax. This drug will make you sleepy. It is usually given in a vein (by IV). A shot may also be used to numb the area. If you had local anesthesia, you may feel some pain and discomfort as it wears off. If you have pain, don't be afraid to say so. Pain medicine works better if you take it before the pain gets bad. Common side effects from sedation include: · Feeling sleepy. (Your doctors and nurses will make sure you are not too sleepy to go home.) · Nausea and vomiting. This usually does not last long. · Feeling tired. Follow-up care is a key part of your treatment and safety. Be sure to make and go to all appointments, and call your doctor if you are having problems. It's also a good idea to know your test results and keep a list of the medicines you take. How can you care for yourself at home? Activity ? · Don't do anything for 24 hours that requires attention to detail. It takes time for the medicine effects to completely wear off.  
? · For your safety, you should not drive or operate any machinery that could be dangerous until the medicine wears off and you can think clearly and react easily. ? · Rest when you feel tired. Getting enough sleep will help you recover. Diet ? · You can eat your normal diet, unless your doctor gives you other instructions. If your stomach is upset, try clear liquids and bland, low-fat foods like plain toast or rice. ? · Drink plenty of fluids (unless your doctor tells you not to). ? · Don't drink alcohol for 24 hours. Medicines ? · Be safe with medicines. Read and follow all instructions on the label. ¨ If the doctor gave you a prescription medicine for pain, take it as prescribed. ¨ If you are not taking a prescription pain medicine, ask your doctor if you can take an over-the-counter medicine. ? · If you think your pain medicine is making you sick to your stomach: 
¨ Take your medicine after meals (unless your doctor has told you not to). ¨ Ask your doctor for a different pain medicine. When should you call for help? Call 911 anytime you think you may need emergency care. For example, call if: 
? · You have severe trouble breathing. ? · You passed out (lost consciousness). ?Call your doctor now or seek immediate medical care if: 
? · You have trouble breathing. ? · You have ongoing or worsening nausea or vomiting. ? · You have a fever. ? · You have a new or worse headache. ? · The medicine is not wearing off and you can't think clearly. ? Watch closely for changes in your health, and be sure to contact your doctor if: 
? · You do not get better as expected. Where can you learn more? Go to http://clayton-adin.info/. Enter G246 in the search box to learn more about \"Sedation for a Medical Procedure: Care Instructions. \" Current as of: August 14, 2016 Content Version: 11.4 © 1072-4216 Pinger. Care instructions adapted under license by NICO (which disclaims liability or warranty for this information). If you have questions about a medical condition or this instruction, always ask your healthcare professional. [address] any warranty or liability for your use of this information. DISCHARGE SUMMARY from Nurse PATIENT INSTRUCTIONS: 
 
 
F-face looks uneven A-arms unable to move or move unevenly S-speech slurred or non-existent T-time-call 911 as soon as signs and symptoms begin-DO NOT go Back to bed or wait to see if you get better-TIME IS BRAIN. Warning Signs of HEART ATTACK Call 911 if you have these symptoms: ? Chest discomfort. Most heart attacks involve discomfort in the center of the chest that lasts more than a few minutes, or that goes away and comes back. It can feel like uncomfortable pressure, squeezing, fullness, or pain. ? Discomfort in other areas of the upper body. Symptoms can include pain or discomfort in one or both arms, the back, neck, jaw, or stomach. ? Shortness of breath with or without chest discomfort. ? Other signs may include breaking out in a cold sweat, nausea, or lightheadedness. Don't wait more than five minutes to call [address]! Fast action can save your life. Calling 911 is almost always the fastest way to get lifesaving treatment. Emergency Medical Services staff can begin treatment when they arrive  up to an hour sooner than if someone gets to the hospital by car. The discharge information has been reviewed with the patient and spouse. The patient and spouse verbalized understanding. Discharge medications reviewed with the patient and spouse and appropriate educational materials and side effects teaching were provided. Patient armband removed and shredded 
 
___________________________________________________________________________________________________________________________________ Introducing Michele Rebolledo As a [facility] patient, I wanted to make you aware of our electronic visit tool called Michele Rebolledo. [facility] 24/7 allows you to connect within minutes with a medical provider 24 hours a day, seven days a week via a mobile device or tablet or logging into a secure website from your computer. You can access Michele Rebolledo from anywhere in the [place].  
 
A virtual visit might be right for you when you have a simple condition and feel like you just dont want to get out of bed, or cant get away from work for an appointment, when your regular [facility] provider is not available (evenings, weekends or holidays), or when youre out of town and need minor care. Electronic visits cost only $49 and if the [facility]ene[facility] 24/7 provider determines a prescription is needed to treat your condition, one can be electronically transmitted to a nearby pharmacy*. Please take a moment to enroll today if you have not already done so. The enrollment process is free and takes just a few minutes. To enroll, please download the ApeniMED 24/7 kiran to your tablet or phone, or visit [email or web address] to enroll on your computer. And, as an [address] [place] [address] patient with a Elance account, the results of your visits will be scanned into your electronic medical record and your primary care provider will be able to view the scanned results. We urge you to continue to see your regular [facility]ret Menezes provider for your ongoing medical care. And while your primary care provider may not be the one available when you seek a [email or web address] virtual visit, the peace of mind you get from getting a real diagnosis real time can be priceless. For more information on PLASTIQ, view our Frequently Asked Questions (FAQs) at [email or web address]. Sincerely, 
 
Fantasma Hines MD 
Chief Medical Officer [address] [place] [address] *:  certain medications cannot be prescribed via PLASTIQ Unresulted Labs-Please follow up with your PCP about these lab tests Order Current Status IR ANGIO EXT UPPER LT In process Providers Seen During Your Hospitalization Provider Specialty Primary office phone Ramakrishna Jon MD Vascular Surgery [phone] Your Primary Care Physician (PCP) Primary Care Physician Office Phone Office Fax 500 [facility], [address] [place] [address] [phone] You are allergic to the following Allergen Reactions Contrast Dye (Iodine) Anaphylaxis Compazine (Prochlorperazine Edisylate) Other (comments) Tries to swallow tongue? Recent Documentation Height Weight Breastfeeding? BMI OB Status Smoking Status 1.626 m 67.4 kg No 25.49 kg/m2 Hysterectomy Never Smoker Emergency Contacts Name Discharge Info Relation Home Work Mobile Sharif Pardo DISCHARGE CAREGIVER [3] Spouse [3] [phone] Char Olguin DISCHARGE CAREGIVER [3] Spouse [3] [phone] Patient Belongings The following personal items are in your possession at time of discharge: 
     Visual Aid: None Please provide this summary of care documentation to your next provider. Signatures-by signing, you are acknowledging that this After Visit Summary has been reviewed with you and you have received a copy. Patient Signature:  ____________________________________________________________ Date:  ____________________________________________________________  
  
[facility] Finger Provider Signature:  ____________________________________________________________ Date:  ____________________________________________________________

## 2018-04-23 NOTE — PROGRESS NOTES
TRANSFER - OUT REPORT:    Verbal report given to Reji Puente RCIS(name) on [facility]  being transferred to care unit(unit) for routine progression of care       Report consisted of patients Situation, Background, Assessment and   Recommendations(SBAR). Information from the following report(s) SBAR, Kardex, Procedure Summary and MAR was reviewed with the receiving nurse. Lines:   Peripheral IV 04/23/18 Left Forearm (Active)        Opportunity for questions and clarification was provided. angioseal pamphlet given to patient family.   Patient transported with:   Registered Nurse

## 2018-05-01 ENCOUNTER — OFFICE VISIT (OUTPATIENT)
Dept: VASCULAR SURGERY | Age: 73
End: 2018-05-01

## 2018-05-01 VITALS
WEIGHT: 148 LBS | SYSTOLIC BLOOD PRESSURE: 162 MMHG | HEIGHT: 64 IN | DIASTOLIC BLOOD PRESSURE: 80 MMHG | HEART RATE: 70 BPM | BODY MASS INDEX: 25.27 KG/M2 | RESPIRATION RATE: 18 BRPM

## 2018-05-01 DIAGNOSIS — I73.9 PERIPHERAL ARTERIAL DISEASE (HCC): Primary | ICD-10-CM

## 2018-05-01 DIAGNOSIS — R09.89 BRUIT OF LEFT CAROTID ARTERY: ICD-10-CM

## 2018-05-01 NOTE — MR AVS SNAPSHOT
[address] [facility] 
 
 
 [address] [facility] [address] [facility] 
[phone] Patient: Sandra Wells MRN: [id number] :1945 Visit Information Date & Time Provider Department Dept. Phone Encounter #  
 2018 11:00 AM Freedom Garcia MD BS Vein/Vascular Spec [address] [id number] Your Appointments 2018 11:00 AM  
Follow Up with Kenna Vasquez NP  
BS Vein/Vascular Spec THE [facility] (EDWAR SCHEDULING) Appt Note: 3 months [facility] with [facility] [address] [facility]  
  
   
 [address] [facility] [address] Upcoming Health Maintenance Date Due Hepatitis C Screening 1945 DTaP/Tdap/Td series (1 - Tdap) 1968 BREAST CANCER SCRN MAMMOGRAM 1997 FOBT Q 1 YEAR AGE 50-75 1997 ZOSTER VACCINE AGE 60> 2007 GLAUCOMA SCREENING Q2Y 2012 Bone Densitometry (Dexa) Screening 2012 Pneumococcal 65+ Low/Medium Risk (1 of 2 - PCV13) 2012 MEDICARE YEARLY EXAM 3/20/2018 Influenza Age 5 to Adult 2018 Allergies as of 2018  Review Complete On: 2018 By: Cy Ramirez RN Severity Noted Reaction Type Reactions Contrast Dye [Iodine] High 2015    Anaphylaxis Compazine [Prochlorperazine Edisylate]  2015    Other (comments) Tries to swallow tongue? Current Immunizations  Never Reviewed No immunizations on file. Not reviewed this visit You Were Diagnosed With   
  
 Codes Comments Peripheral arterial disease ([facility] [address]    -  Primary ICD-10-CM: I73.9 ICD-9-CM: 443.9 Bruit of left carotid artery     ICD-10-CM: R09.89 ICD-9-CM: 192. 9 Vitals BP Pulse Resp Height(growth percentile) Weight(growth percentile) BMI  
 162/80 (BP 1 Location: Right arm, BP Patient Position: Sitting) 70 18 5' 4\" (1.626 m) 148 lb (67.1 kg) 25.4 kg/m2 OB Status Smoking Status Hysterectomy Never Smoker Vitals History BMI and BSA Data Body Mass Index Body Surface Area  
 25.4 kg/m 2 1.74 m 2 Your Updated Medication List  
  
   
This list is accurate as of 5/1/18 11:51 AM.  Always use your most recent med list.  
  
  
  
  
 acetaminophen 650 mg Tber Commonly known as:  TYLENOL Take 650 mg by mouth every eight (8) hours. aspirin delayed-release 325 mg tablet Take  by mouth every six (6) hours as needed for Pain. Calcium Carbonate-Vit D3-Min 600 mg (1,500 mg)-400 unit Chew Take  by mouth. COQ10  100-100 mg-unit Cap Generic drug:  coenzyme q10-vitamin e Take  by mouth. diphenhydrAMINE 50 mg tablet Commonly known as:  BENADRYL Take one tab by mouth 7hrs and one hr prior to procedure. LIPITOR 80 mg tablet Generic drug:  atorvastatin Take 80 mg by mouth daily. magnesium oxide 400 mg tablet Commonly known as:  MAG-OX Take 400 mg by mouth daily. metoprolol tartrate 25 mg tablet Commonly known as:  LOPRESSOR Take 1 Tab by mouth every twelve (12) hours. nitroglycerin 0.4 mg SL tablet Commonly known as:  NITROSTAT  
0.4 mg by SubLINGual route every five (5) minutes as needed for Chest Pain.  
  
 potassium chloride 20 mEq packet Commonly known as:  KLOR-CON Take 20 mEq by mouth two (2) times a day. * predniSONE 10 mg tablet Commonly known as:  Luke Lyles Take 10 mg by mouth daily. Indications: idiopathic pulmonary fibrosis * predniSONE 50 mg tablet Commonly known as:  Luke Lyles One tab by mouth 13hrs, 7hrs and One hr prior to procedure. PROzac 20 mg capsule Generic drug:  FLUoxetine Take 20 mg by mouth daily. raNITIdine 150 mg tablet Commonly known as:  ZANTAC Take one tab by mouth 7hrs and one hr prior to procedure. torsemide 5 mg tablet Commonly known as:  DEMADEX Take 5 mg by mouth daily. As needed  Indications: Edema VIT B COMPLEX 100 COMBO NO.2 PO Take  by mouth. VITAMIN D3 1,000 unit tablet Generic drug:  cholecalciferol Take  by mouth daily. * Notice: This list has 2 medication(s) that are the same as other medications prescribed for you. Read the directions carefully, and ask your doctor or other care provider to review them with you. To-Do List   
 08/01/2018 Imaging:  DUPLEX CAROTID BILATERAL Please provide this summary of care documentation to your next provider. Your primary care clinician is listed as Chris Bajwa. Sushila Licona. If you have any questions after today's visit, please call [phone].

## 2018-05-02 NOTE — PROGRESS NOTES
[facility]    Chief Complaint   Patient presents with    Surgical Follow-up       History and Physical    Ms. Freedom Santos returns to our office today for follow up after undergoing a aortic arch angiogram and selective left subclavian artery angiogram.  She still has some shoulder weakness and pain bilaterally. She states her groin felt fine after her angiogram and she has no new complaints. Past Medical History:   Diagnosis Date    Anemia     Arrhythmia     CAD (coronary artery disease)     Clostridium difficile diarrhea     Hypercholesterolemia     Hypertension     Thromboembolus [facility])      Patient Active Problem List   Diagnosis Code    CHF (congestive heart failure) ([facility]) I50.9    Peripheral arterial disease ([facility]) I73.9    Bruit of left carotid artery R09.89     Past Surgical History:   Procedure Laterality Date    CARDIAC SURG PROCEDURE UNLIST      HX CHOLECYSTECTOMY      HX COLONOSCOPY      HX CORONARY ARTERY BYPASS GRAFT      HX HYSTERECTOMY      HX ORTHOPAEDIC      foot surgery     RT & LT HEART WITH C.O.  3/17/2018    JOANNE CORONARY ARTERIOGRAPH  3/17/2018     Current Outpatient Prescriptions   Medication Sig Dispense Refill    cholecalciferol (VITAMIN D3) 1,000 unit tablet Take  by mouth daily.  predniSONE (DELTASONE) 10 mg tablet Take 10 mg by mouth daily. Indications: idiopathic pulmonary fibrosis      CALCIUM CARB/VIT D3/MINERALS (CALCIUM CARBONATE-VIT D3-MIN) 600 mg (1,500 mg)-400 unit chew Take  by mouth.  acetaminophen (TYLENOL) 650 mg TbER Take 650 mg by mouth every eight (8) hours.  VIT B COMPLEX 100 COMBO NO.2 PO Take  by mouth.  magnesium oxide (MAG-OX) 400 mg tablet Take 400 mg by mouth daily.  metoprolol (LOPRESSOR) 25 mg tablet Take 1 Tab by mouth every twelve (12) hours. 40 Tab 0    potassium chloride (KLOR-CON) 20 mEq packet Take 20 mEq by mouth two (2) times a day.  torsemide (DEMADEX) 5 mg tablet Take 5 mg by mouth daily.  As needed  Indications: Edema      coenzyme q10-vitamin e (COQ10 ) 100-100 mg-unit cap Take  by mouth.  aspirin delayed-release 325 mg tablet Take  by mouth every six (6) hours as needed for Pain.  atorvastatin (LIPITOR) 80 mg tablet Take 80 mg by mouth daily.  FLUoxetine (PROZAC) 20 mg capsule Take 20 mg by mouth daily.  nitroglycerin (NITROSTAT) 0.4 mg SL tablet 0.4 mg by SubLINGual route every five (5) minutes as needed for Chest Pain.  raNITIdine (ZANTAC) 150 mg tablet Take one tab by mouth 7hrs and one hr prior to procedure. 2 Tab 0    diphenhydrAMINE (BENADRYL) 50 mg tablet Take one tab by mouth 7hrs and one hr prior to procedure. 2 Tab 0    predniSONE (DELTASONE) 50 mg tablet One tab by mouth 13hrs, 7hrs and One hr prior to procedure. 3 Tab 0     Allergies   Allergen Reactions    Contrast Dye [Iodine] Anaphylaxis    Compazine [Prochlorperazine Edisylate] Other (comments)     Tries to swallow tongue? Social History     Social History    Marital status:      Spouse name: N/A    Number of children: N/A    Years of education: N/A     Occupational History    Not on file. Social History Main Topics    Smoking status: Never Smoker    Smokeless tobacco: Never Used    Alcohol use No    Drug use: No    Sexual activity: Not Currently     Other Topics Concern    Not on file     Social History Narrative      Family History   Problem Relation Age of Onset    Diabetes Mother     Heart Disease Mother     Hypertension Mother     Heart Disease Father     Diabetes Father     Hypertension Father     Stroke Father     Lung Disease Father     Hypertension Brother     Diabetes Brother     Heart Disease Brother        Review of Systems    Review of Systems   Constitutional: Negative for chills, diaphoresis, fever, malaise/fatigue and weight loss. HENT: Negative for hearing loss and sore throat. Eyes: Negative for blurred vision, photophobia and redness. Respiratory: Negative for cough, hemoptysis, shortness of breath and wheezing. Cardiovascular: Negative for chest pain, palpitations and orthopnea. Gastrointestinal: Negative for abdominal pain, blood in stool, constipation, diarrhea, heartburn, nausea and vomiting. Genitourinary: Negative for dysuria, frequency, hematuria and urgency. Musculoskeletal: Positive for joint pain. Negative for back pain and myalgias. Skin: Negative for itching and rash. Neurological: Negative for dizziness, speech change, focal weakness, weakness and headaches. Endo/Heme/Allergies: Does not bruise/bleed easily. Psychiatric/Behavioral: Negative for depression and suicidal ideas. Physical Exam:    Visit Vitals    /80 (BP 1 Location: Right arm, BP Patient Position: Sitting)    Pulse 70    Resp 18    Ht 5' 4\" (1.626 m)    Wt 148 lb (67.1 kg)    BMI 25.4 kg/m2      Physical Examination: General appearance - alert, well appearing, and in no distress  Mental status - alert, oriented to person, place, and time  Eyes - sclera anicteric, left eye normal, right eye normal  Ears - right ear normal, left ear normal  Nose - normal and patent, no erythema, discharge or polyps  Mouth - mucous membranes moist, pharynx normal without lesions  Neck - supple, no significant adenopathy, soft left carotid bruit  Extremities - 2+ radial pulse bilaterally. Impression and Plan:    ICD-10-CM ICD-9-CM    1. Peripheral arterial disease (HCC) I73.9 443.9 DUPLEX CAROTID BILATERAL   2. Bruit of left carotid artery R09.89 785.9 DUPLEX CAROTID BILATERAL     Orders Placed This Encounter    DUPLEX CAROTID BILATERAL     I reviewed the angiogram results with Ms. Pardo and her  again today. We discussed that by angiographic images she does not have any evidence of any significant subclavian stenosis. Additionally, there was not a significant pressure gradient across her subclavian artery origin.   As such no intervention was undertaken and I do not believe her arm/shoulder pain is due to circulation. Ms. Matthew Linda may benefit from an orthopedic evaluation both for this and for her back issues. Given her carotid bruit and concerns about subclavian flow, we will follow her with serial carotid duplexes. Lastly, we have recommended to Ms. Pardo to always obtain her blood pressure readings in her right arm. Ms. Matthew Linda understands and will continue to see us in follow up. Follow-up Disposition:  Return for Vascular labs. The treatment plan was reviewed with the patient in detail. The patient voiced understanding of this plan and all questions and concerns were addressed. The patient agrees with this plan. We discussed the signs and symptoms that would require earlier attention or intervention. The patient was given educational material related to his/her visit and the patient has voiced understanding of the material.     I appreciate the opportunity to participate in the care of your patient. I will be sure to keep you informed of any subsequent changes in the treatment plan. If you have any questions or concerns, please feel free to contact me. Karina Hurd MD    PLEASE NOTE:  This document has been produced using voice recognition software. Unrecognized errors in transcription may be present.

## 2018-05-09 ENCOUNTER — APPOINTMENT (OUTPATIENT)
Dept: CT IMAGING | Age: 73
End: 2018-05-09
Attending: EMERGENCY MEDICINE
Payer: MEDICARE

## 2018-05-09 ENCOUNTER — HOSPITAL ENCOUNTER (EMERGENCY)
Age: 73
Discharge: HOME | End: 2018-05-09
Attending: EMERGENCY MEDICINE
Payer: MEDICARE

## 2018-05-09 VITALS
TEMPERATURE: 97.7 F | OXYGEN SATURATION: 100 % | WEIGHT: 150 LBS | HEART RATE: 72 BPM | BODY MASS INDEX: 26.58 KG/M2 | DIASTOLIC BLOOD PRESSURE: 74 MMHG | RESPIRATION RATE: 17 BRPM | HEIGHT: 63 IN | SYSTOLIC BLOOD PRESSURE: 159 MMHG

## 2018-05-09 DIAGNOSIS — G44.89 OTHER HEADACHE SYNDROME: ICD-10-CM

## 2018-05-09 DIAGNOSIS — I10 HYPERTENSION, UNSPECIFIED TYPE: Primary | ICD-10-CM

## 2018-05-09 LAB
ALBUMIN SERPL-MCNC: 3.9 G/DL (ref 3.4–5)
ALBUMIN/GLOB SERPL: 1.1 {RATIO} (ref 0.8–1.7)
ALP SERPL-CCNC: 71 U/L (ref 45–117)
ALT SERPL-CCNC: 30 U/L (ref 13–56)
ANION GAP SERPL CALC-SCNC: 7 MMOL/L (ref 3–18)
APPEARANCE UR: CLEAR
AST SERPL-CCNC: 17 U/L (ref 15–37)
BACTERIA URNS QL MICRO: ABNORMAL /HPF
BASOPHILS # BLD: 0 K/UL (ref 0–0.06)
BASOPHILS NFR BLD: 0 % (ref 0–2)
BILIRUB SERPL-MCNC: 0.4 MG/DL (ref 0.2–1)
BILIRUB UR QL: NEGATIVE
BUN SERPL-MCNC: 17 MG/DL (ref 7–18)
BUN/CREAT SERPL: 13 (ref 12–20)
CALCIUM SERPL-MCNC: 9.5 MG/DL (ref 8.5–10.1)
CHLORIDE SERPL-SCNC: 103 MMOL/L (ref 100–108)
CK MB CFR SERPL CALC: NORMAL % (ref 0–4)
CK MB SERPL-MCNC: <1 NG/ML (ref 5–25)
CK SERPL-CCNC: 33 U/L (ref 26–192)
CO2 SERPL-SCNC: 33 MMOL/L (ref 21–32)
COLOR UR: YELLOW
CREAT SERPL-MCNC: 1.29 MG/DL (ref 0.6–1.3)
DIFFERENTIAL METHOD BLD: ABNORMAL
EOSINOPHIL # BLD: 0.1 K/UL (ref 0–0.4)
EOSINOPHIL NFR BLD: 1 % (ref 0–5)
EPITH CASTS URNS QL MICRO: ABNORMAL /LPF (ref 0–5)
ERYTHROCYTE [DISTWIDTH] IN BLOOD BY AUTOMATED COUNT: 13.3 % (ref 11.6–14.5)
GLOBULIN SER CALC-MCNC: 3.4 G/DL (ref 2–4)
GLUCOSE BLD STRIP.AUTO-MCNC: 95 MG/DL (ref 70–110)
GLUCOSE SERPL-MCNC: 91 MG/DL (ref 74–99)
GLUCOSE UR STRIP.AUTO-MCNC: NEGATIVE MG/DL
HCT VFR BLD AUTO: 35.7 % (ref 35–45)
HGB BLD-MCNC: 11.7 G/DL (ref 12–16)
HGB UR QL STRIP: NEGATIVE
KETONES UR QL STRIP.AUTO: NEGATIVE MG/DL
LEUKOCYTE ESTERASE UR QL STRIP.AUTO: ABNORMAL
LYMPHOCYTES # BLD: 2.4 K/UL (ref 0.9–3.6)
LYMPHOCYTES NFR BLD: 24 % (ref 21–52)
MCH RBC QN AUTO: 30.5 PG (ref 24–34)
MCHC RBC AUTO-ENTMCNC: 32.8 G/DL (ref 31–37)
MCV RBC AUTO: 93.2 FL (ref 74–97)
MONOCYTES # BLD: 1.1 K/UL (ref 0.05–1.2)
MONOCYTES NFR BLD: 11 % (ref 3–10)
NEUTS SEG # BLD: 6.4 K/UL (ref 1.8–8)
NEUTS SEG NFR BLD: 64 % (ref 40–73)
NITRITE UR QL STRIP.AUTO: NEGATIVE
PH UR STRIP: 7.5 [PH] (ref 5–8)
PLATELET # BLD AUTO: 204 K/UL (ref 135–420)
PMV BLD AUTO: 9.6 FL (ref 9.2–11.8)
POTASSIUM SERPL-SCNC: 3.8 MMOL/L (ref 3.5–5.5)
PROT SERPL-MCNC: 7.3 G/DL (ref 6.4–8.2)
PROT UR STRIP-MCNC: NEGATIVE MG/DL
RBC # BLD AUTO: 3.83 M/UL (ref 4.2–5.3)
RBC #/AREA URNS HPF: ABNORMAL /HPF (ref 0–5)
SODIUM SERPL-SCNC: 143 MMOL/L (ref 136–145)
SP GR UR REFRACTOMETRY: 1.01 (ref 1–1.03)
TROPONIN I SERPL-MCNC: <0.02 NG/ML (ref 0–0.06)
UROBILINOGEN UR QL STRIP.AUTO: 0.2 EU/DL (ref 0.2–1)
WBC # BLD AUTO: 10 K/UL (ref 4.6–13.2)
WBC URNS QL MICRO: ABNORMAL /HPF (ref 0–5)

## 2018-05-09 PROCEDURE — 82962 GLUCOSE BLOOD TEST: CPT

## 2018-05-09 PROCEDURE — 93005 ELECTROCARDIOGRAM TRACING: CPT

## 2018-05-09 PROCEDURE — 81001 URINALYSIS AUTO W/SCOPE: CPT | Performed by: EMERGENCY MEDICINE

## 2018-05-09 PROCEDURE — 80053 COMPREHEN METABOLIC PANEL: CPT | Performed by: EMERGENCY MEDICINE

## 2018-05-09 PROCEDURE — 74011000250 HC RX REV CODE- 250: Performed by: EMERGENCY MEDICINE

## 2018-05-09 PROCEDURE — 96374 THER/PROPH/DIAG INJ IV PUSH: CPT

## 2018-05-09 PROCEDURE — 85025 COMPLETE CBC W/AUTO DIFF WBC: CPT | Performed by: EMERGENCY MEDICINE

## 2018-05-09 PROCEDURE — 70450 CT HEAD/BRAIN W/O DYE: CPT

## 2018-05-09 PROCEDURE — 82553 CREATINE MB FRACTION: CPT | Performed by: EMERGENCY MEDICINE

## 2018-05-09 PROCEDURE — 99285 EMERGENCY DEPT VISIT HI MDM: CPT

## 2018-05-09 RX ORDER — LABETALOL HYDROCHLORIDE 5 MG/ML
20 INJECTION, SOLUTION INTRAVENOUS
Status: COMPLETED | OUTPATIENT
Start: 2018-05-09 | End: 2018-05-09

## 2018-05-09 RX ORDER — LABETALOL HYDROCHLORIDE 5 MG/ML
40 INJECTION, SOLUTION INTRAVENOUS
Status: DISCONTINUED | OUTPATIENT
Start: 2018-05-09 | End: 2018-05-09

## 2018-05-09 RX ORDER — METOPROLOL TARTRATE 25 MG/1
50 TABLET, FILM COATED ORAL EVERY 12 HOURS
Qty: 120 TAB | Refills: 0 | Status: SHIPPED | OUTPATIENT
Start: 2018-05-09 | End: 2018-06-08

## 2018-05-09 RX ADMIN — LABETALOL HYDROCHLORIDE 20 MG: 5 INJECTION, SOLUTION INTRAVENOUS at 15:30

## 2018-05-09 NOTE — ED NOTES
Pt resting quietly in stretcher. V/S recorded, updated MD. Per MD administered 20 mg Labetalol IV. SpO2 drops with movement, placed pt on bedside commode while on 2 L/min NC oxygen. SpO2 88% with movement. With rest and deep breathing SpO2 will improve to 99%. Pt continues to be on CCM and continuous pulse ox. Call bell within reach, stretcher in lowest position, side rails x 2.

## 2018-05-09 NOTE — ED PROVIDER NOTES
EMERGENCY DEPARTMENT HISTORY AND PHYSICAL EXAM    Date: 5/9/2018  Patient Name: Marco Ewing    History of Presenting Illness     Chief Complaint   Patient presents with    Hypertension    Visual Field Change         History Provided By: Patient and Patient's     Chief Complaint: Visual disturbance  Duration: 2 Months  Timing:  Acute and Worsening  Location: Eyes  Quality: \"haloing\" and blurred  Associated Symptoms: hypertension and waxing and waning left frontal HA x 3 months    Additional History (Context):   2:25 PM  Marco Ewing is a 81 y.o. female with PMHx of pulmonary fibrosis, CAD, C. Difficile, anemia, and HTN who presents to the emergency department C/O visual disturbance described as \"haloing\" and blurred vision onset 2 months ago that worsened 4.5 hours ago. Associated sxs include hypertension and waxing and waning left frontal HA x 3 months. Pt reports she was having a pulmonary function test when she was notified of her high blood pressure. Pt reports her last blood pressure was 200/100. Last blood glucose level was 160. Pt did not take her Aspirin or Prednisone this morning. Pt is followed by Aureliano Keane (Vascular Surgery), MD, Timmy Skaggs MD (Cardiology) and Pop Orozco (PCP). Pt denies numbness, weakness, speech difficulty, facial asymmetry, and any other sxs or complaints. PCP: Bruno Lacey. Miguel Ángel, DO    Current Outpatient Prescriptions   Medication Sig Dispense Refill    metoprolol tartrate (LOPRESSOR) 25 mg tablet Take 2 Tabs by mouth every twelve (12) hours for 30 days. 120 Tab 0    cholecalciferol (VITAMIN D3) 1,000 unit tablet Take  by mouth daily.  raNITIdine (ZANTAC) 150 mg tablet Take one tab by mouth 7hrs and one hr prior to procedure. 2 Tab 0    diphenhydrAMINE (BENADRYL) 50 mg tablet Take one tab by mouth 7hrs and one hr prior to procedure. 2 Tab 0    predniSONE (DELTASONE) 50 mg tablet One tab by mouth 13hrs, 7hrs and One hr prior to procedure. 3 Tab 0    predniSONE (DELTASONE) 10 mg tablet Take 10 mg by mouth daily. Indications: idiopathic pulmonary fibrosis      CALCIUM CARB/VIT D3/MINERALS (CALCIUM CARBONATE-VIT D3-MIN) 600 mg (1,500 mg)-400 unit chew Take  by mouth.  acetaminophen (TYLENOL) 650 mg TbER Take 650 mg by mouth every eight (8) hours.  VIT B COMPLEX 100 COMBO NO.2 PO Take  by mouth.  magnesium oxide (MAG-OX) 400 mg tablet Take 400 mg by mouth daily.  potassium chloride (KLOR-CON) 20 mEq packet Take 20 mEq by mouth two (2) times a day.  torsemide (DEMADEX) 5 mg tablet Take 5 mg by mouth daily. As needed  Indications: Edema      coenzyme q10-vitamin e (COQ10 ) 100-100 mg-unit cap Take  by mouth.  aspirin delayed-release 325 mg tablet Take  by mouth every six (6) hours as needed for Pain.  atorvastatin (LIPITOR) 80 mg tablet Take 80 mg by mouth daily.  FLUoxetine (PROZAC) 20 mg capsule Take 20 mg by mouth daily.  nitroglycerin (NITROSTAT) 0.4 mg SL tablet 0.4 mg by SubLINGual route every five (5) minutes as needed for Chest Pain.          Past History     Past Medical History:  Past Medical History:   Diagnosis Date    Anemia     Arrhythmia     CAD (coronary artery disease)     Clostridium difficile diarrhea     Hypercholesterolemia     Hypertension     Thromboembolus ([address]        Past Surgical History:  Past Surgical History:   Procedure Laterality Date    CARDIAC SURG PROCEDURE UNLIST      HX CHOLECYSTECTOMY      HX COLONOSCOPY      HX CORONARY ARTERY BYPASS GRAFT      HX HYSTERECTOMY      HX ORTHOPAEDIC      foot surgery     RT & LT HEART WITH C.O.  3/17/2018    JOANNE CORONARY ARTERIOGRAPH  3/17/2018       Family History:  Family History   Problem Relation Age of Onset    Diabetes Mother     Heart Disease Mother     Hypertension Mother     Heart Disease Father     Diabetes Father     Hypertension Father     Stroke Father     Lung Disease Father     Hypertension Brother     Diabetes Brother     Heart Disease Brother        Social History:  Social History   Substance Use Topics    Smoking status: Never Smoker    Smokeless tobacco: Never Used    Alcohol use No       Allergies: Allergies   Allergen Reactions    Contrast Dye [Iodine] Anaphylaxis    Compazine [Prochlorperazine Edisylate] Other (comments)     Tries to swallow tongue? Review of Systems   Review of Systems   Constitutional:        (+) HTN   Eyes: Positive for visual disturbance (\"haloing\" and blurred). Neurological: Positive for headaches (left frontal). Negative for facial asymmetry, speech difficulty, weakness and numbness. All other systems reviewed and are negative. Physical Exam     Vitals:    05/09/18 1435 05/09/18 1524 05/09/18 1530 05/09/18 1545   BP: (!) 220/85 186/77 190/75 167/73   Pulse: 67 63 64 73   Resp: 16 16 14 18   Temp: 97.7 °F (36.5 °C)      SpO2: 100% 99% 99% 99%   Weight: 68 kg (150 lb)      Height: 5' 3\" (1.6 m)        Physical Exam   Nursing note and vitals reviewed.   Constitutional: Elderly appearing, no acute distress  Head: Normocephalic, Atraumatic  Eyes: Pupils are equal, round, and reactive to light, EOMI  Neck: Supple, non-tender  Cardiovascular: Regular rate and rhythm, no murmurs, rubs, or gallops  Chest: Normal work of breathing and chest excursion bilaterally  Lungs: Clear to ausculation bilaterally  Abdomen: Soft, non tender, non distended, normoactive bowel sounds  Back: No evidence of trauma or deformity  Extremities: No evidence of trauma or deformity, no LE edema  Skin: Warm and dry, normal cap refill  Neuro: Alert and appropriate, CN intact, normal speech, strength and sensation full and symmetric bilaterally, normal coordination  Psychiatric: Normal mood and affect    Diagnostic Study Results     Labs -     Recent Results (from the past 12 hour(s))   GLUCOSE, POC    Collection Time: 05/09/18  2:23 PM   Result Value Ref Range    Glucose (POC) 95 70 - 110 mg/dL   EKG, 12 LEAD, INITIAL    Collection Time: 05/09/18  2:43 PM   Result Value Ref Range    Ventricular Rate 65 BPM    Atrial Rate 65 BPM    P-R Interval 146 ms    QRS Duration 126 ms    Q-T Interval 436 ms    QTC Calculation (Bezet) 453 ms    Calculated P Axis 20 degrees    Calculated R Axis -23 degrees    Calculated T Axis 9 degrees    Diagnosis       Normal sinus rhythm  Right bundle branch block  Moderate voltage criteria for LVH, may be normal variant  Abnormal ECG  When compared with ECG of 07-JUL-2015 13:19,  Vent. rate has decreased BY  57 BPM  Right bundle branch block is now present  Borderline criteria for Lateral infarct are no longer present  Criteria for Inferior-posterior infarct are no longer present     CBC WITH AUTOMATED DIFF    Collection Time: 05/09/18  2:48 PM   Result Value Ref Range    WBC 10.0 4.6 - 13.2 K/uL    RBC 3.83 (L) 4.20 - 5.30 M/uL    HGB 11.7 (L) 12.0 - 16.0 g/dL    HCT 35.7 35.0 - 45.0 %    MCV 93.2 74.0 - 97.0 FL    MCH 30.5 24.0 - 34.0 PG    MCHC 32.8 31.0 - 37.0 g/dL    RDW 13.3 11.6 - 14.5 %    PLATELET 490 734 - 309 K/uL    MPV 9.6 9.2 - 11.8 FL    NEUTROPHILS 64 40 - 73 %    LYMPHOCYTES 24 21 - 52 %    MONOCYTES 11 (H) 3 - 10 %    EOSINOPHILS 1 0 - 5 %    BASOPHILS 0 0 - 2 %    ABS. NEUTROPHILS 6.4 1.8 - 8.0 K/UL    ABS. LYMPHOCYTES 2.4 0.9 - 3.6 K/UL    ABS. MONOCYTES 1.1 0.05 - 1.2 K/UL    ABS. EOSINOPHILS 0.1 0.0 - 0.4 K/UL    ABS.  BASOPHILS 0.0 0.0 - 0.06 K/UL    DF AUTOMATED     METABOLIC PANEL, COMPREHENSIVE    Collection Time: 05/09/18  2:48 PM   Result Value Ref Range    Sodium 143 136 - 145 mmol/L    Potassium 3.8 3.5 - 5.5 mmol/L    Chloride 103 100 - 108 mmol/L    CO2 33 (H) 21 - 32 mmol/L    Anion gap 7 3.0 - 18 mmol/L    Glucose 91 74 - 99 mg/dL    BUN 17 7.0 - 18 MG/DL    Creatinine 1.29 0.6 - 1.3 MG/DL    BUN/Creatinine ratio 13 12 - 20      GFR est AA 50 (L) >60 ml/min/1.73m2    GFR est non-AA 41 (L) >60 ml/min/1.73m2    Calcium 9.5 8.5 - 10.1 MG/DL Bilirubin, total 0.4 0.2 - 1.0 MG/DL    ALT (SGPT) 30 13 - 56 U/L    AST (SGOT) 17 15 - 37 U/L    Alk. phosphatase 71 45 - 117 U/L    Protein, total 7.3 6.4 - 8.2 g/dL    Albumin 3.9 3.4 - 5.0 g/dL    Globulin 3.4 2.0 - 4.0 g/dL    A-G Ratio 1.1 0.8 - 1.7     CARDIAC PANEL,(CK, CKMB & TROPONIN)    Collection Time: 05/09/18  2:48 PM   Result Value Ref Range    CK 33 26 - 192 U/L    CK - MB <1.0 <3.6 ng/ml    CK-MB Index  0.0 - 4.0 %     CALCULATION NOT PERFORMED WHEN RESULT IS BELOW LINEAR LIMIT    Troponin-I, Qt. <0.02 0.00 - 0.06 NG/ML       Radiologic Studies -   CT HEAD WO CONT   Final Result   IMPRESSION:        1. No acute intracranial abnormality. 2. Mild periventricular white matter hypoattenuation; a nonspecific finding,  likely reflecting chronic ischemic microvascular change. As read by the radiologist.     CT Results  (Last 48 hours)               05/09/18 1503  CT HEAD WO CONT Final result    Impression:  IMPRESSION:           1. No acute intracranial abnormality. 2. Mild periventricular white matter hypoattenuation; a nonspecific finding,   likely reflecting chronic ischemic microvascular change. Narrative:  EXAM: CT head       INDICATION: Headache for approximately 2-3 weeks. COMPARISON: None. TECHNIQUE: Axial CT imaging of the head was performed without intravenous   contrast.       One or more dose reduction techniques were used on this CT: automated exposure   control, adjustment of the mAs and/or kVp according to patient's size, and   iterative reconstruction techniques. The specific techniques utilized on this CT   exam have been documented in the patient's electronic medical record.        _______________       FINDINGS:       BRAIN AND POSTERIOR FOSSA: Mild cortical and cerebellar volume loss is present,   which is within normal limits for patient age. Ventricular size and   configuration is within normal limits.  Mild periventricular white matter   hypoattenuation is noted. Basal cisterns are patent. There is no intracranial   hemorrhage, mass effect, or midline shift. Gray-white matter differentiation is   within normal limits. EXTRA-AXIAL SPACES AND MENINGES: There are no abnormal extra-axial fluid   collections. CALVARIUM: No acute osseous abnormality. SINUSES: Imaged paranasal sinuses and mastoid air cells are clear. OTHER: Atherosclerotic calcification of the carotid siphons present bilaterally. _______________               CXR Results  (Last 48 hours)    None          Medications given in the ED-  Medications   labetalol (NORMODYNE;TRANDATE) injection 20 mg (20 mg IntraVENous Given 5/9/18 1530)         Medical Decision Making   I am the first provider for this patient. I reviewed the vital signs, available nursing notes, past medical history, past surgical history, family history and social history. Vital Signs-Reviewed the patient's vital signs. Pulse Oximetry Analysis - 100% on RA     Cardiac Monitor:  Rate: 67 bpm  Rhythm: NSR    EKG interpretation: (Preliminary)  Rhythm: NSR. Rate: 65 bpm; RBBB  EKG read by Yobani Can MD at 2:52 PM     Records Reviewed: Nursing Notes and Old Medical Records    Procedures:  Procedures    ED Course:   2:25 PM Initial assessment performed. The patients presenting problems have been discussed, and they are in agreement with the care plan formulated and outlined with them. I have encouraged them to ask questions as they arise throughout their visit. 4:00 PM Discussed patient's history, exam, and available diagnostics results with Anaid Padgett. Anjelica Briones, patient's PCP, who recommends increasing her Metoprolol to 50 mg BID. Will follow up with her at his office. Diagnosis and Disposition     Discussion:  81 y.o female presenting for hypertension, chronic HA and visual halos. No acute abnormality on labs or imaging. Symptoms resolved with blood pressure management here.  Discussed with pt's PCP to make adjustment to blood pressure regimen. Will discharge with return precautions. Pt understands and agrees with this plan. DISCHARGE NOTE:  4:10 PM  Mable Pardo's  results have been reviewed with her. She has been counseled regarding her diagnosis, treatment, and plan. She verbally conveys understanding and agreement of the signs, symptoms, diagnosis, treatment and prognosis and additionally agrees to follow up as discussed. She also agrees with the care-plan and conveys that all of her questions have been answered. I have also provided discharge instructions for her that include: educational information regarding their diagnosis and treatment, and list of reasons why they would want to return to the ED prior to their follow-up appointment, should her condition change. She has been provided with education for proper emergency department utilization. CLINICAL IMPRESSION:    1. Hypertension, unspecified type    2. Other headache syndrome        PLAN:  1. D/C Home  2. Current Discharge Medication List      CONTINUE these medications which have CHANGED    Details   metoprolol tartrate (LOPRESSOR) 25 mg tablet Take 2 Tabs by mouth every twelve (12) hours for 30 days. Qty: 120 Tab, Refills: 0           3. Follow-up Information     Follow up With Details Comments Contact Info    Marty Del Rosario, DO Schedule an appointment as soon as possible for a visit in 3 days For primary care follow up. [address] [address]      THE [facility] EMERGENCY DEPT Go to As needed, If symptoms worsen [address] [address]  [phone]        _______________________________    Attestations: This note is prepared by Dominique Bernal, acting as Scribe for Frandy Workman MD.    Frandy Workman MD:  The scribe's documentation has been prepared under my direction and personally reviewed by me in its entirety.   I confirm that the note above accurately reflects all work, treatment, procedures, and medical decision making performed by me.  _______________________________

## 2018-05-09 NOTE — ED TRIAGE NOTES
Pt reports having high blood pressure when she went to have a pulmonary function test;  Also states that this morning approx 10 am developed vision change where she felt like she had \"halos\"  Or she was looking through coke bottles; No speech problems, no facial droop, no weakness in extremities, no trouble walking;  Denies dizziness; Pt reports left front headache on and off for 3 months and blood pressure steadily rising since recent diagnosis of pulmonary fibrosis.

## 2018-05-09 NOTE — ED NOTES
Discharge instructions reviewed with opportunity for questions provided. Pt vocalized understanding. Armband removed and shredded. Pt stable condition at time of discharge. Pt ambulating with a slow, steady gait with improved work of breathing. Pt states improvement in her vision.  at bedside.

## 2018-05-09 NOTE — DISCHARGE INSTRUCTIONS
High Blood Pressure: Care Instructions  Your Care Instructions    If your blood pressure is usually above 140/90, you have high blood pressure, or hypertension. That means the top number is 140 or higher or the bottom number is 90 or higher, or both. Despite what a lot of people think, high blood pressure usually doesn't cause headaches or make you feel dizzy or lightheaded. It usually has no symptoms. But it does increase your risk for heart attack, stroke, and kidney or eye damage. The higher your blood pressure, the more your risk increases. Your doctor will give you a goal for your blood pressure. Your goal will be based on your health and your age. An example of a goal is to keep your blood pressure below 140/90. Lifestyle changes, such as eating healthy and being active, are always important to help lower blood pressure. You might also take medicine to reach your blood pressure goal.  Follow-up care is a key part of your treatment and safety. Be sure to make and go to all appointments, and call your doctor if you are having problems. It's also a good idea to know your test results and keep a list of the medicines you take. How can you care for yourself at home? Medical treatment  · If you stop taking your medicine, your blood pressure will go back up. You may take one or more types of medicine to lower your blood pressure. Be safe with medicines. Take your medicine exactly as prescribed. Call your doctor if you think you are having a problem with your medicine. · Talk to your doctor before you start taking aspirin every day. Aspirin can help certain people lower their risk of a heart attack or stroke. But taking aspirin isn't right for everyone, because it can cause serious bleeding. · See your doctor regularly. You may need to see the doctor more often at first or until your blood pressure comes down.   · If you are taking blood pressure medicine, talk to your doctor before you take decongestants or anti-inflammatory medicine, such as ibuprofen. Some of these medicines can raise blood pressure. · Learn how to check your blood pressure at home. Lifestyle changes  · Stay at a healthy weight. This is especially important if you put on weight around the waist. Losing even 10 pounds can help you lower your blood pressure. · If your doctor recommends it, get more exercise. Walking is a good choice. Bit by bit, increase the amount you walk every day. Try for at least 30 minutes on most days of the week. You also may want to swim, bike, or do other activities. · Avoid or limit alcohol. Talk to your doctor about whether you can drink any alcohol. · Try to limit how much sodium you eat to less than 2,300 milligrams (mg) a day. Your doctor may ask you to try to eat less than 1,500 mg a day. · Eat plenty of fruits (such as bananas and oranges), vegetables, legumes, whole grains, and low-fat dairy products. · Lower the amount of saturated fat in your diet. Saturated fat is found in animal products such as milk, cheese, and meat. Limiting these foods may help you lose weight and also lower your risk for heart disease. · Do not smoke. Smoking increases your risk for heart attack and stroke. If you need help quitting, talk to your doctor about stop-smoking programs and medicines. These can increase your chances of quitting for good. When should you call for help? Call 911 anytime you think you may need emergency care. This may mean having symptoms that suggest that your blood pressure is causing a serious heart or blood vessel problem. Your blood pressure may be over 180/110. ? For example, call 911 if:  ? · You have symptoms of a heart attack. These may include:  ¨ Chest pain or pressure, or a strange feeling in the chest.  ¨ Sweating. ¨ Shortness of breath. ¨ Nausea or vomiting.   ¨ Pain, pressure, or a strange feeling in the back, neck, jaw, or upper belly or in one or both shoulders or arms.  ¨ Lightheadedness or sudden weakness. ¨ A fast or irregular heartbeat. ? · You have symptoms of a stroke. These may include:  ¨ Sudden numbness, tingling, weakness, or loss of movement in your face, arm, or leg, especially on only one side of your body. ¨ Sudden vision changes. ¨ Sudden trouble speaking. ¨ Sudden confusion or trouble understanding simple statements. ¨ Sudden problems with walking or balance. ¨ A sudden, severe headache that is different from past headaches. ? · You have severe back or belly pain. ?Do not wait until your blood pressure comes down on its own. Get help right away. ?Call your doctor now or seek immediate care if:  ? · Your blood pressure is much higher than normal (such as 180/110 or higher), but you don't have symptoms. ? · You think high blood pressure is causing symptoms, such as:  ¨ Severe headache. ¨ Blurry vision. ? Watch closely for changes in your health, and be sure to contact your doctor if:  ? · Your blood pressure measures 140/90 or higher at least 2 times. That means the top number is 140 or higher or the bottom number is 90 or higher, or both. ? · You think you may be having side effects from your blood pressure medicine. ? · Your blood pressure is usually normal, but it goes above normal at least 2 times. Where can you learn more? Go to http://clayton-adin.info/. Enter U929 in the search box to learn more about \"High Blood Pressure: Care Instructions. \"  Current as of: September 21, 2016  Content Version: 11.4  © 2923-2171 [employer]. Care instructions adapted under license by [employer] (which disclaims liability or warranty for this information). If you have questions about a medical condition or this instruction, always ask your healthcare professional. [place] [address] any warranty or liability for your use of this information.          Headache: Care Instructions  Your Care Instructions    Headaches have many possible causes. Most headaches aren't a sign of a more serious problem, and they will get better on their own. Home treatment may help you feel better faster. The doctor has checked you carefully, but problems can develop later. If you notice any problems or new symptoms, get medical treatment right away. Follow-up care is a key part of your treatment and safety. Be sure to make and go to all appointments, and call your doctor if you are having problems. It's also a good idea to know your test results and keep a list of the medicines you take. How can you care for yourself at home? · Do not drive if you have taken a prescription pain medicine. · Rest in a quiet, dark room until your headache is gone. Close your eyes and try to relax or go to sleep. Don't watch TV or read. · Put a cold, moist cloth or cold pack on the painful area for 10 to 20 minutes at a time. Put a thin cloth between the cold pack and your skin. · Use a warm, moist towel or a heating pad set on low to relax tight shoulder and neck muscles. · Have someone gently massage your neck and shoulders. · Take pain medicines exactly as directed. ¨ If the doctor gave you a prescription medicine for pain, take it as prescribed. ¨ If you are not taking a prescription pain medicine, ask your doctor if you can take an over-the-counter medicine. · Be careful not to take pain medicine more often than the instructions allow, because you may get worse or more frequent headaches when the medicine wears off. · Do not ignore new symptoms that occur with a headache, such as a fever, weakness or numbness, vision changes, or confusion. These may be signs of a more serious problem. To prevent headaches  · Keep a headache diary so you can figure out what triggers your headaches. Avoiding triggers may help you prevent headaches.  Record when each headache began, how long it lasted, and what the pain was like (throbbing, aching, stabbing, or dull). Write down any other symptoms you had with the headache, such as nausea, flashing lights or dark spots, or sensitivity to bright light or loud noise. Note if the headache occurred near your period. List anything that might have triggered the headache, such as certain foods (chocolate, cheese, wine) or odors, smoke, bright light, stress, or lack of sleep. · Find healthy ways to deal with stress. Headaches are most common during or right after stressful times. Take time to relax before and after you do something that has caused a headache in the past.  · Try to keep your muscles relaxed by keeping good posture. Check your jaw, face, neck, and shoulder muscles for tension, and try relaxing them. When sitting at a desk, change positions often, and stretch for 30 seconds each hour. · Get plenty of sleep and exercise. · Eat regularly and well. Long periods without food can trigger a headache. · Treat yourself to a massage. Some people find that regular massages are very helpful in relieving tension. · Limit caffeine by not drinking too much coffee, tea, or soda. But don't quit caffeine suddenly, because that can also give you headaches. · Reduce eyestrain from computers by blinking frequently and looking away from the computer screen every so often. Make sure you have proper eyewear and that your monitor is set up properly, about an arm's length away. · Seek help if you have depression or anxiety. Your headaches may be linked to these conditions. Treatment can both prevent headaches and help with symptoms of anxiety or depression. When should you call for help? Call 911 anytime you think you may need emergency care. For example, call if:  ? · You have signs of a stroke. These may include:  ¨ Sudden numbness, paralysis, or weakness in your face, arm, or leg, especially on only one side of your body. ¨ Sudden vision changes. ¨ Sudden trouble speaking.   ¨ Sudden confusion or trouble understanding simple statements. ¨ Sudden problems with walking or balance. ¨ A sudden, severe headache that is different from past headaches. ?Call your doctor now or seek immediate medical care if:  ? · You have a new or worse headache. ? · Your headache gets much worse. Where can you learn more? Go to http://clayton-adin.info/. Enter M271 in the search box to learn more about \"Headache: Care Instructions. \"  Current as of: October 14, 2016  Content Version: 11.4  © 1600-6077 [employer]. Care instructions adapted under license by [employer] (which disclaims liability or warranty for this information). If you have questions about a medical condition or this instruction, always ask your healthcare professional. [address] any warranty or liability for your use of this information.

## 2018-05-13 LAB
ATRIAL RATE: 65 BPM
CALCULATED P AXIS, ECG09: 20 DEGREES
CALCULATED R AXIS, ECG10: -23 DEGREES
CALCULATED T AXIS, ECG11: 9 DEGREES
DIAGNOSIS, 93000: NORMAL
P-R INTERVAL, ECG05: 146 MS
Q-T INTERVAL, ECG07: 436 MS
QRS DURATION, ECG06: 126 MS
QTC CALCULATION (BEZET), ECG08: 453 MS
VENTRICULAR RATE, ECG03: 65 BPM

## 2018-08-08 ENCOUNTER — HOSPITAL ENCOUNTER (OUTPATIENT)
Dept: VASCULAR SURGERY | Age: 73
Discharge: HOME | End: 2018-08-08
Attending: SURGERY
Payer: MEDICARE

## 2018-08-08 DIAGNOSIS — I73.9 PERIPHERAL ARTERIAL DISEASE (HCC): ICD-10-CM

## 2018-08-08 DIAGNOSIS — R09.89 BRUIT OF LEFT CAROTID ARTERY: ICD-10-CM

## 2018-08-08 LAB
LEFT CCA DIST DIAS: 13.85 CM/S
LEFT CCA DIST SYS: 61.11 CM/S
LEFT CCA MID DIAS: 10.55 CM/S
LEFT CCA MID SYS: 51.22 CM/S
LEFT CCA PROX DIAS: 9.05 CM/S
LEFT CCA PROX SYS: 76.88 CM/S
LEFT ECA DIAS: 3.96 CM/S
LEFT ECA SYS: 73.2 CM/S
LEFT ICA DIST DIAS: 25.94 CM/S
LEFT ICA DIST SYS: 85.29 CM/S
LEFT ICA MID DIAS: 30.34 CM/S
LEFT ICA MID SYS: 85.29 CM/S
LEFT ICA PROX DIAS: 20.44 CM/S
LEFT ICA PROX SYS: 71.01 CM/S
LEFT ICA/CCA SYS: 1.4
LEFT SUBCLAVIAN DIAS: 13.01 CM/S
LEFT SUBCLAVIAN SYS: 158.87 CM/S
LEFT VERTEBRAL DIAS: 10.55 CM/S
LEFT VERTEBRAL SYS: 31.44 CM/S
RIGHT CCA DIST DIAS: 15.13 CM/S
RIGHT CCA DIST SYS: 68.6 CM/S
RIGHT CCA PROX DIAS: 9.91 CM/S
RIGHT CCA PROX SYS: 80.34 CM/S
RIGHT ECA DIAS: 13.83 CM/S
RIGHT ECA SYS: 82.95 CM/S
RIGHT ICA DIST DIAS: 28.17 CM/S
RIGHT ICA DIST SYS: 92.08 CM/S
RIGHT ICA MID DIAS: 26.87 CM/S
RIGHT ICA MID SYS: 86.86 CM/S
RIGHT ICA PROX DIAS: 12.52 CM/S
RIGHT ICA PROX SYS: 67.3 CM/S
RIGHT ICA/CCA SYS: 1.34
RIGHT SUBCLAVIAN DIAS: 0 CM/S
RIGHT SUBCLAVIAN SYS: 196.55 CM/S
RIGHT VERTEBRAL DIAS: 11.22 CM/S
RIGHT VERTEBRAL SYS: 63.39 CM/S

## 2018-08-08 PROCEDURE — 93880 EXTRACRANIAL BILAT STUDY: CPT

## 2018-08-15 ENCOUNTER — OFFICE VISIT (OUTPATIENT)
Dept: VASCULAR SURGERY | Age: 73
End: 2018-08-15

## 2018-08-15 VITALS
HEART RATE: 70 BPM | RESPIRATION RATE: 16 BRPM | HEIGHT: 63 IN | SYSTOLIC BLOOD PRESSURE: 120 MMHG | WEIGHT: 150 LBS | BODY MASS INDEX: 26.58 KG/M2 | DIASTOLIC BLOOD PRESSURE: 70 MMHG

## 2018-08-15 DIAGNOSIS — I65.23 CAROTID STENOSIS, ASYMPTOMATIC, BILATERAL: Primary | ICD-10-CM

## 2018-08-15 DIAGNOSIS — I73.9 PERIPHERAL ARTERIAL DISEASE (HCC): ICD-10-CM

## 2018-08-15 RX ORDER — LISINOPRIL 5 MG/1
5 TABLET ORAL
COMMUNITY

## 2018-08-15 NOTE — PROGRESS NOTES
[address]    Chief Complaint   Patient presents with    Leg Pain       History and Physical    Mrs. Tito Bajwa is a 72year old female who returns to our office for follow up of her PAD and carotid stenosis. She reports generalized weakness in all extremities and chronic shortness of breath, which she attributes to her chronic lung disease. She has had no change in the symptoms of her left arm and does not describe any claudication symptoms. She specifically denies any stroke/TIA symptoms. She states she is going to [place] for work up of her lung disease and is eager to see what comes of that. Past Medical History:   Diagnosis Date    Anemia     Arrhythmia     CAD (coronary artery disease)     Clostridium difficile diarrhea     Hypercholesterolemia     Hypertension     Thromboembolus [facility])      Patient Active Problem List   Diagnosis Code    CHF (congestive heart failure) ([facility]) I50.9    Peripheral arterial disease ([facility]) I73.9    Bruit of left carotid artery R09.89     Past Surgical History:   Procedure Laterality Date    CARDIAC SURG PROCEDURE UNLIST      HX CHOLECYSTECTOMY      HX COLONOSCOPY      HX CORONARY ARTERY BYPASS GRAFT      HX HYSTERECTOMY      HX ORTHOPAEDIC      foot surgery     RT & LT HEART WITH C.O.  3/17/2018    JOANNE CORONARY ARTERIOGRAPH  3/17/2018     Current Outpatient Prescriptions   Medication Sig Dispense Refill    lisinopril (PRINIVIL, ZESTRIL) 5 mg tablet Take 5 mg by mouth.  cholecalciferol (VITAMIN D3) 1,000 unit tablet Take  by mouth daily.  raNITIdine (ZANTAC) 150 mg tablet Take one tab by mouth 7hrs and one hr prior to procedure. 2 Tab 0    diphenhydrAMINE (BENADRYL) 50 mg tablet Take one tab by mouth 7hrs and one hr prior to procedure. 2 Tab 0    predniSONE (DELTASONE) 50 mg tablet One tab by mouth 13hrs, 7hrs and One hr prior to procedure. 3 Tab 0    predniSONE (DELTASONE) 10 mg tablet Take 10 mg by mouth daily.  Indications: idiopathic pulmonary fibrosis      CALCIUM CARB/VIT D3/MINERALS (CALCIUM CARBONATE-VIT D3-MIN) 600 mg (1,500 mg)-400 unit chew Take  by mouth.  acetaminophen (TYLENOL) 650 mg TbER Take 650 mg by mouth every eight (8) hours.  VIT B COMPLEX 100 COMBO NO.2 PO Take  by mouth.  magnesium oxide (MAG-OX) 400 mg tablet Take 400 mg by mouth daily.  potassium chloride (KLOR-CON) 20 mEq packet Take 20 mEq by mouth two (2) times a day.  torsemide (DEMADEX) 5 mg tablet Take 5 mg by mouth daily. As needed  Indications: Edema      coenzyme q10-vitamin e (COQ10 ) 100-100 mg-unit cap Take  by mouth.  aspirin delayed-release 325 mg tablet Take  by mouth every six (6) hours as needed for Pain.  atorvastatin (LIPITOR) 80 mg tablet Take 80 mg by mouth daily.  FLUoxetine (PROZAC) 20 mg capsule Take 20 mg by mouth daily.  nitroglycerin (NITROSTAT) 0.4 mg SL tablet 0.4 mg by SubLINGual route every five (5) minutes as needed for Chest Pain. Allergies   Allergen Reactions    Contrast Dye [Iodine] Anaphylaxis    Compazine [Prochlorperazine Edisylate] Other (comments)     Tries to swallow tongue? Social History     Social History    Marital status:      Spouse name: N/A    Number of children: N/A    Years of education: N/A     Occupational History    Not on file.      Social History Main Topics    Smoking status: Never Smoker    Smokeless tobacco: Never Used    Alcohol use No    Drug use: No    Sexual activity: Not Currently     Other Topics Concern    Not on file     Social History Narrative      Family History   Problem Relation Age of Onset    Diabetes Mother     Heart Disease Mother     Hypertension Mother     Heart Disease Father     Diabetes Father     Hypertension Father     Stroke Father     Lung Disease Father     Hypertension Brother     Diabetes Brother     Heart Disease Brother        Review of Systems    Review of Systems   Constitutional: Negative for chills, fever, malaise/fatigue and weight loss. HENT: Negative for ear pain and hearing loss. Eyes: Negative for blurred vision, pain and discharge. Respiratory: Positive for shortness of breath. Negative for cough, hemoptysis and sputum production. Cardiovascular: Negative for chest pain, palpitations, orthopnea and claudication. Gastrointestinal: Negative for heartburn, nausea and vomiting. Genitourinary: Negative for dysuria and urgency. Musculoskeletal: Negative for myalgias. Skin: Negative for itching and rash. Neurological: Positive for weakness. Negative for dizziness, tingling, sensory change, speech change, focal weakness and headaches. Endo/Heme/Allergies: Does not bruise/bleed easily. Psychiatric/Behavioral: Negative for depression. Physical Exam:    Visit Vitals    /70 (BP 1 Location: Right arm, BP Patient Position: Sitting)    Pulse 70    Resp 16    Ht 5' 3\" (1.6 m)    Wt 150 lb (68 kg)    BMI 26.57 kg/m2      Physical Examination: General appearance - alert, well appearing, and in no distress  Mental status - alert, oriented to person, place, and time, normal mood, behavior, speech, dress, motor activity, and thought processes  Eyes - left eye normal, right eye normal  Ears - right ear normal, left ear normal  Mouth - mucous membranes moist  Chest - clear to auscultation, no wheezes, on home O2  Heart - normal rate and regular rhythm  Abdomen - soft, nontender, nondistended  Musculoskeletal - no obvious deformity, strength in extremities equal bilaterally  Extremities - warm and well perfused, no edema, palpable radial pulses bilaterally  Skin - normal coloration and turgor, no rashes, no suspicious skin lesions noted      Impression and Plan:    ICD-10-CM ICD-9-CM    1. Carotid stenosis, asymptomatic, bilateral I65.23 433.10 DUPLEX CAROTID BILATERAL     433.30    2.  Peripheral arterial disease (HCC) I73.9 443.9      Orders Placed This Encounter    lisinopril (PRINIVIL, ZESTRIL) 5 mg tablet     I had a discussion with Mrs. Pardo and her family member regarding her recent carotid duplex showing less than 50% stenosis bilaterally. I explained that this is a good finding and that we will follow this annually. Mrs. Isidra Augustine has had no change in her left arm symptoms and her left arm angiogram did no reveal any significant subclavian stenosis. We will see her again in one year with repeat carotid duplex. Follow-up Disposition:  Return in about 1 year (around 8/15/2019). The treatment plan was reviewed with the patient in detail. The patient voiced understanding of this plan and all questions and concerns were addressed. The patient agrees with this plan. We discussed the signs and symptoms that would require earlier attention or intervention. The patient was given educational material related to his/her visit and the patient has voiced understanding of the material.     I appreciate the opportunity to participate in the care of your patient. I will be sure to keep you informed of any subsequent changes in the treatment plan. If you have any questions or concerns, please feel free to contact me. Marleen Mccallum NP    PLEASE NOTE:  This document has been produced using voice recognition software. Unrecognized errors in transcription may be present.